# Patient Record
Sex: FEMALE | Race: WHITE | NOT HISPANIC OR LATINO | Employment: OTHER | URBAN - METROPOLITAN AREA
[De-identification: names, ages, dates, MRNs, and addresses within clinical notes are randomized per-mention and may not be internally consistent; named-entity substitution may affect disease eponyms.]

---

## 2017-01-03 RX ORDER — CYCLOBENZAPRINE HCL 5 MG
TABLET ORAL
Qty: 90 TAB | Refills: 1 | Status: SHIPPED | OUTPATIENT
Start: 2017-01-03 | End: 2017-03-11 | Stop reason: SDUPTHER

## 2017-01-03 NOTE — TELEPHONE ENCOUNTER
Was the patient seen in the last year in this department? Yes     Does patient have an active prescription for medications requested? Yes     Received Request Via: Pharmacy

## 2017-01-03 NOTE — TELEPHONE ENCOUNTER
Was the patient seen in the last year in this department? Yes 11/23/16    Does patient have an active prescription for medications requested? No     Received Request Via: Pharmacy

## 2017-03-13 RX ORDER — CYCLOBENZAPRINE HCL 5 MG
TABLET ORAL
Qty: 90 TAB | Refills: 2 | Status: SHIPPED | OUTPATIENT
Start: 2017-03-13 | End: 2018-03-30

## 2017-03-30 ENCOUNTER — HOSPITAL ENCOUNTER (EMERGENCY)
Facility: MEDICAL CENTER | Age: 47
End: 2017-03-30
Attending: EMERGENCY MEDICINE
Payer: MEDICARE

## 2017-03-30 VITALS
SYSTOLIC BLOOD PRESSURE: 125 MMHG | BODY MASS INDEX: 29.75 KG/M2 | RESPIRATION RATE: 16 BRPM | DIASTOLIC BLOOD PRESSURE: 92 MMHG | WEIGHT: 162.7 LBS | TEMPERATURE: 98.3 F | OXYGEN SATURATION: 97 % | HEART RATE: 116 BPM

## 2017-03-30 DIAGNOSIS — H65.91 RIGHT NON-SUPPURATIVE OTITIS MEDIA: ICD-10-CM

## 2017-03-30 DIAGNOSIS — J06.9 UPPER RESPIRATORY TRACT INFECTION, UNSPECIFIED TYPE: ICD-10-CM

## 2017-03-30 DIAGNOSIS — J40 BRONCHITIS: ICD-10-CM

## 2017-03-30 PROCEDURE — 99282 EMERGENCY DEPT VISIT SF MDM: CPT

## 2017-03-30 RX ORDER — ACETIC ACID 20.65 MG/ML
2 SOLUTION AURICULAR (OTIC) 3 TIMES DAILY
Qty: 1 BOTTLE | Refills: 0 | Status: SHIPPED | OUTPATIENT
Start: 2017-03-30 | End: 2018-04-23

## 2017-03-30 RX ORDER — ALBUTEROL SULFATE 90 UG/1
2 AEROSOL, METERED RESPIRATORY (INHALATION) EVERY 4 HOURS PRN
Qty: 1 INHALER | Refills: 1 | OUTPATIENT
Start: 2017-03-30 | End: 2018-04-23

## 2017-03-30 RX ORDER — AZITHROMYCIN 250 MG/1
TABLET, FILM COATED ORAL
Qty: 6 QUANTITY SUFFICIENT | Refills: 0 | OUTPATIENT
Start: 2017-03-30 | End: 2018-03-30

## 2017-03-30 RX ORDER — HYDROCODONE BITARTRATE AND ACETAMINOPHEN 5; 325 MG/1; MG/1
1-2 TABLET ORAL EVERY 6 HOURS PRN
Qty: 10 TAB | Refills: 0 | Status: SHIPPED | OUTPATIENT
Start: 2017-03-30 | End: 2018-03-30

## 2017-03-30 ASSESSMENT — LIFESTYLE VARIABLES: DO YOU DRINK ALCOHOL: NO

## 2017-03-30 NOTE — ED NOTES
"Chief Complaint   Patient presents with   • Ear Drainage     pt reports that her right ear \"ruptured\" while she was sleeping. pt reports heavy discharge from ear.     Pt crying and inconsolable in triage.  Blood pressure 125/92, pulse 116, temperature 36.8 °C (98.3 °F), resp. rate 16, weight 73.8 kg (162 lb 11.2 oz), SpO2 97 %.  Pt informed of wait times. Educated on triage process.  Asked to return to triage RN for any new or worsening of symptoms. Thanked for patience.        "

## 2017-03-30 NOTE — DISCHARGE INSTRUCTIONS
Bronchitis  Bronchitis is the body's way of reacting to injury and/or infection (inflammation) of the bronchi. Bronchi are the air tubes that extend from the windpipe into the lungs. If the inflammation becomes severe, it may cause shortness of breath.  CAUSES   Inflammation may be caused by:  · A virus.  · Germs (bacteria).  · Dust.  · Allergens.  · Pollutants and many other irritants.  The cells lining the bronchial tree are covered with tiny hairs (cilia). These constantly beat upward, away from the lungs, toward the mouth. This keeps the lungs free of pollutants. When these cells become too irritated and are unable to do their job, mucus begins to develop. This causes the characteristic cough of bronchitis. The cough clears the lungs when the cilia are unable to do their job. Without either of these protective mechanisms, the mucus would settle in the lungs. Then you would develop pneumonia.  Smoking is a common cause of bronchitis and can contribute to pneumonia. Stopping this habit is the single most important thing you can do to help yourself.  TREATMENT   · Your caregiver may prescribe an antibiotic if the cough is caused by bacteria. Also, medicines that open up your airways make it easier to breathe. Your caregiver may also recommend or prescribe an expectorant. It will loosen the mucus to be coughed up. Only take over-the-counter or prescription medicines for pain, discomfort, or fever as directed by your caregiver.  · Removing whatever causes the problem (smoking, for example) is critical to preventing the problem from getting worse.  · Cough suppressants may be prescribed for relief of cough symptoms.  · Inhaled medicines may be prescribed to help with symptoms now and to help prevent problems from returning.  · For those with recurrent (chronic) bronchitis, there may be a need for steroid medicines.  SEEK IMMEDIATE MEDICAL CARE IF:   · During treatment, you develop more pus-like mucus (purulent  sputum).  · You have a fever.  · Your baby is older than 3 months with a rectal temperature of 102° F (38.9° C) or higher.  · Your baby is 3 months old or younger with a rectal temperature of 100.4° F (38° C) or higher.  · You become progressively more ill.  · You have increased difficulty breathing, wheezing, or shortness of breath.  It is necessary to seek immediate medical care if you are elderly or sick from any other disease.  MAKE SURE YOU:   · Understand these instructions.  · Will watch your condition.  · Will get help right away if you are not doing well or get worse.  Document Released: 12/18/2006 Document Revised: 03/11/2013 Document Reviewed: 10/27/2009  [employer]® Patient Information ©2014 [employer].    Ear Drops, Adult  You have been diagnosed with a condition requiring you to put drops of medicine into your outer ear.  HOME CARE INSTRUCTIONS   · Put drops in the affected ear as instructed. After putting the drops in, you will need to lie down with the affected ear facing up for ten minutes so the drops will remain in the ear canal and run down and fill the canal. Continue using the ear drops for as long as directed by your health care provider.  · Prior to getting up, put a cotton ball gently in your ear canal. Leave enough of the cotton ball out so it can be easily removed. Do not attempt to push this down into the canal with a cotton-tipped swab or other instrument.  · Do not irrigate or wash out your ears if you have had a perforated eardrum or mastoid surgery, or unless instructed to do so by your health care provider.  · Keep appointments with your health care provider as instructed.  · Finish all medicine, or use for the length of time prescribed by your health care provider. Continue the drops even if your problem seems to be doing well after a couple days, or continue as instructed.  SEEK MEDICAL CARE IF:  · You become worse or develop increasing pain.  · You notice any unusual drainage  from your ear (particularly if the drainage has a bad smell).  · You develop hearing difficulties.  · You experience a serious form of dizziness in which you feel as if the room is spinning, and you feel nauseated (vertigo).  · The outside of your ear becomes red or swollen or both. This may be a sign of an allergic reaction.  MAKE SURE YOU:   · Understand these instructions.  · Will watch your condition.  · Will get help right away if you are not doing well or get worse.     This information is not intended to replace advice given to you by your health care provider. Make sure you discuss any questions you have with your health care provider.     Document Released: 12/12/2002 Document Revised: 01/08/2016 Document Reviewed: 07/15/2014  [employer] Interactive Patient Education ©2016 [employer]    Otitis Media, Adult  Otitis media is redness, soreness, and inflammation of the middle ear. Otitis media may be caused by allergies or, most commonly, by infection. Often it occurs as a complication of the common cold.  SIGNS AND SYMPTOMS  Symptoms of otitis media may include:  · Earache.  · Fever.  · Ringing in your ear.  · Headache.  · Leakage of fluid from the ear.  DIAGNOSIS  To diagnose otitis media, your health care provider will examine your ear with an otoscope. This is an instrument that allows your health care provider to see into your ear in order to examine your eardrum. Your health care provider also will ask you questions about your symptoms.  TREATMENT   Typically, otitis media resolves on its own within 3-5 days. Your health care provider may prescribe medicine to ease your symptoms of pain. If otitis media does not resolve within 5 days or is recurrent, your health care provider may prescribe antibiotic medicines if he or she suspects that a bacterial infection is the cause.  HOME CARE INSTRUCTIONS   · If you were prescribed an antibiotic medicine, finish it all even if you start to feel better.  · Take  "medicines only as directed by your health care provider.  · Keep all follow-up visits as directed by your health care provider.  SEEK MEDICAL CARE IF:  · You have otitis media only in one ear, or bleeding from your nose, or both.  · You notice a lump on your neck.  · You are not getting better in 3-5 days.  · You feel worse instead of better.  SEEK IMMEDIATE MEDICAL CARE IF:   · You have pain that is not controlled with medicine.  · You have swelling, redness, or pain around your ear or stiffness in your neck.  · You notice that part of your face is paralyzed.  · You notice that the bone behind your ear (mastoid) is tender when you touch it.  MAKE SURE YOU:   · Understand these instructions.  · Will watch your condition.  · Will get help right away if you are not doing well or get worse.     This information is not intended to replace advice given to you by your health care provider. Make sure you discuss any questions you have with your health care provider.     Document Released: 09/22/2005 Document Revised: 01/08/2016 Document Reviewed: 07/15/2014  [employer] Interactive Patient Education ©2016 [employer]    Viral Infections  A viral infection can be caused by different types of viruses. Most viral infections are not serious and resolve on their own. However, some infections may cause severe symptoms and may lead to further complications.  SYMPTOMS  Viruses can frequently cause:  · Minor sore throat.  · Aches and pains.  · Headaches.  · Runny nose.  · Different types of rashes.  · Watery eyes.  · Tiredness.  · Cough.  · Loss of appetite.  · Gastrointestinal infections, resulting in nausea, vomiting, and diarrhea.  These symptoms do not respond to antibiotics because the infection is not caused by bacteria. However, you might catch a bacterial infection following the viral infection. This is sometimes called a \"superinfection.\" Symptoms of such a bacterial infection may include:  · Worsening sore throat with pus " and difficulty swallowing.  · Swollen neck glands.  · Chills and a high or persistent fever.  · Severe headache.  · Tenderness over the sinuses.  · Persistent overall ill feeling (malaise), muscle aches, and tiredness (fatigue).  · Persistent cough.  · Yellow, green, or brown mucus production with coughing.  HOME CARE INSTRUCTIONS   · Only take over-the-counter or prescription medicines for pain, discomfort, diarrhea, or fever as directed by your caregiver.  · Drink enough water and fluids to keep your urine clear or pale yellow. Sports drinks can provide valuable electrolytes, sugars, and hydration.  · Get plenty of rest and maintain proper nutrition. Soups and broths with crackers or rice are fine.  SEEK IMMEDIATE MEDICAL CARE IF:   · You have severe headaches, shortness of breath, chest pain, neck pain, or an unusual rash.  · You have uncontrolled vomiting, diarrhea, or you are unable to keep down fluids.  · You or your child has an oral temperature above 102° F (38.9° C), not controlled by medicine.  · Your baby is older than 3 months with a rectal temperature of 102° F (38.9° C) or higher.  · Your baby is 3 months old or younger with a rectal temperature of 100.4° F (38° C) or higher.  MAKE SURE YOU:   · Understand these instructions.  · Will watch your condition.  · Will get help right away if you are not doing well or get worse.     This information is not intended to replace advice given to you by your health care provider. Make sure you discuss any questions you have with your health care provider.     Document Released: 09/27/2006 Document Revised: 03/11/2013 Document Reviewed: 04/23/2012  [email or web address] Interactive Patient Education ©2016 [email or web address]  Return if fever, vomiting or if no better in 12 hours.

## 2017-03-30 NOTE — ED AVS SNAPSHOT
3/30/2017          Taylor Salamanca  [address] [place] [id number]    Dear Taylor:    [facility] wants to ensure your discharge home is safe and you or your loved ones have had all your questions answered regarding your care after you leave the hospital.    You may receive a telephone call within two days of your discharge.  This call is to make certain you understand your discharge instructions as well as ensure we provided you with the best care possible during your stay with us.     The call will only last approximately 3-5 minutes and will be done by a nurse.    Once again, we want to ensure your discharge home is safe and that you have a clear understanding of any next steps in your care.  If you have any questions or concerns, please do not hesitate to contact us, we are here for you.  Thank you for choosing [facility] for your healthcare needs.    Sincerely,    Db Gonzales    [facility]

## 2017-03-30 NOTE — ED PROVIDER NOTES
"ED Provider Note    CHIEF COMPLAINT  Chief Complaint   Patient presents with   • Ear Drainage     pt reports that her right ear \"ruptured\" while she was sleeping. pt reports heavy discharge from ear.       HPI  Taylor Salamanca is a 46 y.o. female who presents with coughing, for the last 4 days, cough is nonproductive. No fever. Has had a sore throat. Last night she noticed drainage from the right ear, slight right ear pain. No vomiting or diarrhea.    REVIEW OF SYSTEMS  See HPI for further details. History of narcolepsy, depression low back pain thyroid cancerDenies other G.I., G.U.. endrocine, cardiovascular, respriatory or neurological problems.     PAST MEDICAL HISTORY  Past Medical History   Diagnosis Date   • Narcolepsy and cataplexy    • Bipolar 2 disorder (CMS-HCC)    • LBP (low back pain)    • Neck pain    • Allergy    • Depression    • Hyperlipidemia    • Arthritis    • Aphthous stomatitis    • MEDICAL HOME    • Thyroid cancer (CMS-[facility]) 1/2012     followed by [facility].  papillary cancer treated with surgery.  will start chemo         SOCIAL HISTORY        CURRENT MEDICATIONS  Home Medications     **Home medications have not yet been reviewed for this encounter**          ALLERGIES  Allergies   Allergen Reactions   • Nkda [No Known Drug Allergy]        PHYSICAL EXAM  VITAL SIGNS: /92 mmHg  Pulse 116  Temp(Src) 36.8 °C (98.3 °F)  Resp 16  Wt 73.8 kg (162 lb 11.2 oz)  SpO2 97%  Constitutional: Well developed, Well nourished, No acute distress, Non-toxic appearance.   HENT: Normocephalic, Atraumatic, F tears normal right ear, there is exudate in the right external auditory canal however the canal is nontender, Oropharynx moist, No oral exudates, Nose normal.   Eyes: PERRLA, EOMI, Conjunctiva normal, No discharge.   Neck: Normal range of motion, No tenderness, Supple, No stridor.   Cardiovascular:  Heart sounds are normal no murmurs or rubs  Thorax & Lungs: Lungs are clear equal breath hands " bilaterally no rhonchi rales or wheezes. Chest wall motion is nonlabored  Abdomen: Bowel sounds normal, Soft, No tenderness, No masses, No pulsatile masses.   Skin: Warm, Dry, No erythema, No rash.   Neurologic: Alert & oriented x 3, Normal motor function, Normal sensory function, No focal deficits noted.         COURSE & MEDICAL DECISION MAKING  Pertinent Labs & Imaging studies reviewed. (See chart for details)    Patient probably has a URI, bronchitis, however probably has otitis media with perforation of the right tympanic membrane. We treated with Cortisporin otic drops, Z-Julius, albuterol Norco. I have checked with PMD  FINAL IMPRESSION  1.  1. Upper respiratory tract infection, unspecified type    2. Bronchitis    3. Right non-suppurative otitis media        2.   3.    Disposition:  Discharge instructions are understood. This patient is to return if fever vomiting or no better in 12 hours. Follow up with the [facility] clinic or private physician. Information sheets on URI bronchitis otitis media    Electronically signed by: Binh Gunn, 3/30/2017 10:49 AM

## 2017-03-30 NOTE — ED AVS SNAPSHOT
Home Care Instructions                                                                                                                Taylor Salamanca   MRN: [id number]    Department:  [facility], Emergency Dept   Date of Visit:  3/30/2017            [facility], Emergency Dept    [address]    [place] [address]    Phone:  [phone]      You were seen by     Binh Gunn M.D.      Your Diagnosis Was     Upper respiratory tract infection, unspecified type     J06.9       Follow-up Information     1. Follow up with LINDA Aguirre. Schedule an appointment as soon as possible for a visit today.    Specialty:  Family Medicine    Contact information    [address] [place] [address] [phone]        Medication Information     Review all of your home medications and newly ordered medications with your primary doctor and/or pharmacist as soon as possible. Follow medication instructions as directed by your doctor and/or pharmacist.     Please keep your complete medication list with you and share with your physician. Update the information when medications are discontinued, doses are changed, or new medications (including over-the-counter products) are added; and carry medication information at all times in the event of emergency situations.               Medication List      START taking these medications        Instructions    Morning Afternoon Evening Bedtime    acetic acid 2 % otic solution   Commonly known as:  VOSOL        Place 2 Drops in right ear 3 times a day.   Dose:  2 Drop                        albuterol 108 (90 BASE) MCG/ACT Aers inhalation aerosol        Inhale 2 Puffs by mouth every four hours as needed for Shortness of Breath.   Dose:  2 Puff                        azithromycin 250 MG Tabs   Commonly known as:  ZITHROMAX Z-MIKE        Doctor's comments:  2 tablets on day 1, then 1 tablet by mouth daily for the next 4 days   2 tablets on day  1, then 1 tablet by mouth daily for the next 4 days                          ASK your doctor about these medications        Instructions    Morning Afternoon Evening Bedtime    acetaminophen-codeine #3 300-30 MG Tabs   Commonly known as:  TYLENOL #3        Take 1 Tab by mouth every 6 hours as needed for Mild Pain.   Dose:  1 Tab                        Armodafinil 150 MG Tabs        Take 150 mg by mouth every day.   Dose:  150 mg                        aspirin 325 MG Tabs   Commonly known as:  ASA        Take 1,300-1,625 mg by mouth 4 times a day. 4-5 times daily   Dose:  3797-7823 mg                        celecoxib 200 MG Caps   Commonly known as:  CELEBREX        Doctor's comments:  Discontinue mobic   Take 1 Cap by mouth every day.   Dose:  200 mg                        cyclobenzaprine 5 MG tablet   Commonly known as:  FLEXERIL        TAKE 1 TABLET BY MOUTH 3 TIMES A DAY AS NEEDED FOR MUSCLE SPASMS.                        fluoxetine 40 MG capsule   Commonly known as:  PROZAC        TAKE ONE CAPSULE BY MOUTH ONCE DAILY                        fluticasone 50 MCG/ACT nasal spray   Commonly known as:  FLONASE        Spray 2 Sprays in nose every day.   Dose:  2 Spray                        gabapentin 100 MG Caps   Commonly known as:  NEURONTIN        Take 100-200 mg by mouth every day. 100 mg every morning 200 mg at bedtime   Dose:  100-200 mg                        * hydrocodone-acetaminophen 7.5-325 MG per tablet   What changed:  Another medication with the same name was added. Make sure you understand how and when to take each.   Commonly known as:  NORCO   Ask about: Which instructions should I use?        Take 1 Tab by mouth every 12 hours as needed for Severe Pain.   Dose:  1 Tab                        * hydrocodone-acetaminophen 5-325 MG Tabs per tablet   What changed:  You were already taking a medication with the same name, and this prescription was added. Make sure you understand how and when to take each.      Commonly known as:  NORCO   Ask about: Which instructions should I use?        Take 1-2 Tabs by mouth every 6 hours as needed.   Dose:  1-2 Tab                        ibuprofen 600 MG Tabs   Commonly known as:  MOTRIN        Take 1 Tab by mouth every 6 hours as needed.   Dose:  600 mg                        levothyroxine 150 MCG Tabs   Commonly known as:  SYNTHROID        Take 1 Tab by mouth every morning before breakfast. ON EMPTY STOMACH   Dose:  150 mcg                        tramadol 50 MG Tabs   Commonly known as:  ULTRAM        Doctor's comments:  Not to exceed 5 additional fills before 03/21/2017.   Take 1 Tab by mouth every 6 hours as needed.   Dose:  50 mg                        zolpidem 5 MG Tabs   Commonly known as:  AMBIEN        Take 1-2 tablets by mouth every evening as needed for insomnia. Bring to sleep study.                        * Notice:  This list has 2 medication(s) that are the same as other medications prescribed for you. Read the directions carefully, and ask your doctor or other care provider to review them with you.         Where to Get Your Medications      These medications were sent to [id number] - [place] - [address] [place] [address] AT [address] [address]     Phone:  [phone]    - albuterol 108 (90 BASE) MCG/ACT Aers inhalation aerosol  - azithromycin 250 MG Tabs      You can get these medications from any pharmacy     Bring a paper prescription for each of these medications    - acetic acid 2 % otic solution  - hydrocodone-acetaminophen 5-325 MG Tabs per tablet              Discharge Instructions       Bronchitis  Bronchitis is the body's way of reacting to injury and/or infection (inflammation) of the bronchi. Bronchi are the air tubes that extend from the windpipe into the lungs. If the inflammation becomes severe, it may cause shortness of breath.  CAUSES   Inflammation may be caused by:  · A virus.  · Germs  (bacteria).  · Dust.  · Allergens.  · Pollutants and many other irritants.  The cells lining the bronchial tree are covered with tiny hairs (cilia). These constantly beat upward, away from the lungs, toward the mouth. This keeps the lungs free of pollutants. When these cells become too irritated and are unable to do their job, mucus begins to develop. This causes the characteristic cough of bronchitis. The cough clears the lungs when the cilia are unable to do their job. Without either of these protective mechanisms, the mucus would settle in the lungs. Then you would develop pneumonia.  Smoking is a common cause of bronchitis and can contribute to pneumonia. Stopping this habit is the single most important thing you can do to help yourself.  TREATMENT   · Your caregiver may prescribe an antibiotic if the cough is caused by bacteria. Also, medicines that open up your airways make it easier to breathe. Your caregiver may also recommend or prescribe an expectorant. It will loosen the mucus to be coughed up. Only take over-the-counter or prescription medicines for pain, discomfort, or fever as directed by your caregiver.  · Removing whatever causes the problem (smoking, for example) is critical to preventing the problem from getting worse.  · Cough suppressants may be prescribed for relief of cough symptoms.  · Inhaled medicines may be prescribed to help with symptoms now and to help prevent problems from returning.  · For those with recurrent (chronic) bronchitis, there may be a need for steroid medicines.  SEEK IMMEDIATE MEDICAL CARE IF:   · During treatment, you develop more pus-like mucus (purulent sputum).  · You have a fever.  · Your baby is older than 3 months with a rectal temperature of 102° F (38.9° C) or higher.  · Your baby is 3 months old or younger with a rectal temperature of 100.4° F (38° C) or higher.  · You become progressively more ill.  · You have increased difficulty breathing, wheezing, or  shortness of breath.  It is necessary to seek immediate medical care if you are elderly or sick from any other disease.  MAKE SURE YOU:   · Understand these instructions.  · Will watch your condition.  · Will get help right away if you are not doing well or get worse.  Document Released: 12/18/2006 Document Revised: 03/11/2013 Document Reviewed: 10/27/2009  Exit[employer]® Patient Information ©2014 [employer].    Ear Drops, Adult  You have been diagnosed with a condition requiring you to put drops of medicine into your outer ear.  HOME CARE INSTRUCTIONS   · Put drops in the affected ear as instructed. After putting the drops in, you will need to lie down with the affected ear facing up for ten minutes so the drops will remain in the ear canal and run down and fill the canal. Continue using the ear drops for as long as directed by your health care provider.  · Prior to getting up, put a cotton ball gently in your ear canal. Leave enough of the cotton ball out so it can be easily removed. Do not attempt to push this down into the canal with a cotton-tipped swab or other instrument.  · Do not irrigate or wash out your ears if you have had a perforated eardrum or mastoid surgery, or unless instructed to do so by your health care provider.  · Keep appointments with your health care provider as instructed.  · Finish all medicine, or use for the length of time prescribed by your health care provider. Continue the drops even if your problem seems to be doing well after a couple days, or continue as instructed.  SEEK MEDICAL CARE IF:  · You become worse or develop increasing pain.  · You notice any unusual drainage from your ear (particularly if the drainage has a bad smell).  · You develop hearing difficulties.  · You experience a serious form of dizziness in which you feel as if the room is spinning, and you feel nauseated (vertigo).  · The outside of your ear becomes red or swollen or both. This may be a sign of an allergic  reaction.  MAKE SURE YOU:   · Understand these instructions.  · Will watch your condition.  · Will get help right away if you are not doing well or get worse.     This information is not intended to replace advice given to you by your health care provider. Make sure you discuss any questions you have with your health care provider.     Document Released: 12/12/2002 Document Revised: 01/08/2016 Document Reviewed: 07/15/2014  [employer] Interactive Patient Education ©2016 [employer]    Otitis Media, Adult  Otitis media is redness, soreness, and inflammation of the middle ear. Otitis media may be caused by allergies or, most commonly, by infection. Often it occurs as a complication of the common cold.  SIGNS AND SYMPTOMS  Symptoms of otitis media may include:  · Earache.  · Fever.  · Ringing in your ear.  · Headache.  · Leakage of fluid from the ear.  DIAGNOSIS  To diagnose otitis media, your health care provider will examine your ear with an otoscope. This is an instrument that allows your health care provider to see into your ear in order to examine your eardrum. Your health care provider also will ask you questions about your symptoms.  TREATMENT   Typically, otitis media resolves on its own within 3-5 days. Your health care provider may prescribe medicine to ease your symptoms of pain. If otitis media does not resolve within 5 days or is recurrent, your health care provider may prescribe antibiotic medicines if he or she suspects that a bacterial infection is the cause.  HOME CARE INSTRUCTIONS   · If you were prescribed an antibiotic medicine, finish it all even if you start to feel better.  · Take medicines only as directed by your health care provider.  · Keep all follow-up visits as directed by your health care provider.  SEEK MEDICAL CARE IF:  · You have otitis media only in one ear, or bleeding from your nose, or both.  · You notice a lump on your neck.  · You are not getting better in 3-5 days.  · You feel  "worse instead of better.  SEEK IMMEDIATE MEDICAL CARE IF:   · You have pain that is not controlled with medicine.  · You have swelling, redness, or pain around your ear or stiffness in your neck.  · You notice that part of your face is paralyzed.  · You notice that the bone behind your ear (mastoid) is tender when you touch it.  MAKE SURE YOU:   · Understand these instructions.  · Will watch your condition.  · Will get help right away if you are not doing well or get worse.     This information is not intended to replace advice given to you by your health care provider. Make sure you discuss any questions you have with your health care provider.     Document Released: 09/22/2005 Document Revised: 01/08/2016 Document Reviewed: 07/15/2014  [employer] Interactive Patient Education ©2016 [employer]    Viral Infections  A viral infection can be caused by different types of viruses. Most viral infections are not serious and resolve on their own. However, some infections may cause severe symptoms and may lead to further complications.  SYMPTOMS  Viruses can frequently cause:  · Minor sore throat.  · Aches and pains.  · Headaches.  · Runny nose.  · Different types of rashes.  · Watery eyes.  · Tiredness.  · Cough.  · Loss of appetite.  · Gastrointestinal infections, resulting in nausea, vomiting, and diarrhea.  These symptoms do not respond to antibiotics because the infection is not caused by bacteria. However, you might catch a bacterial infection following the viral infection. This is sometimes called a \"superinfection.\" Symptoms of such a bacterial infection may include:  · Worsening sore throat with pus and difficulty swallowing.  · Swollen neck glands.  · Chills and a high or persistent fever.  · Severe headache.  · Tenderness over the sinuses.  · Persistent overall ill feeling (malaise), muscle aches, and tiredness (fatigue).  · Persistent cough.  · Yellow, green, or brown mucus production with coughing.  HOME CARE " INSTRUCTIONS   · Only take over-the-counter or prescription medicines for pain, discomfort, diarrhea, or fever as directed by your caregiver.  · Drink enough water and fluids to keep your urine clear or pale yellow. Sports drinks can provide valuable electrolytes, sugars, and hydration.  · Get plenty of rest and maintain proper nutrition. Soups and broths with crackers or rice are fine.  SEEK IMMEDIATE MEDICAL CARE IF:   · You have severe headaches, shortness of breath, chest pain, neck pain, or an unusual rash.  · You have uncontrolled vomiting, diarrhea, or you are unable to keep down fluids.  · You or your child has an oral temperature above 102° F (38.9° C), not controlled by medicine.  · Your baby is older than 3 months with a rectal temperature of 102° F (38.9° C) or higher.  · Your baby is 3 months old or younger with a rectal temperature of 100.4° F (38° C) or higher.  MAKE SURE YOU:   · Understand these instructions.  · Will watch your condition.  · Will get help right away if you are not doing well or get worse.     This information is not intended to replace advice given to you by your health care provider. Make sure you discuss any questions you have with your health care provider.     Document Released: 09/27/2006 Document Revised: 03/11/2013 Document Reviewed: 04/23/2012  [employer] Interactive Patient Education ©2016 [employer]  Return if fever, vomiting or if no better in 12 hours.          Patient Information     Patient Information    Following emergency treatment: all patient requiring follow-up care must return either to a private physician or a clinic if your condition worsens before you are able to obtain further medical attention, please return to the emergency room.     Billing Information    At [facility], we work to make the billing process streamlined for our patients.  Our Representatives are here to answer any questions you may have regarding your hospital bill.  If you have  insurance coverage and have supplied your insurance information to us, we will submit a claim to your insurer on your behalf.  Should you have any questions regarding your bill, we can be reached online or by phone as follows:  Online: You are able pay your bills online or live chat with our representatives about any billing questions you may have. We are here to help Monday - Friday from 8:00am to 7:30pm and 9:00am - 12:00pm on Saturdays.  Please visit [email or web address]  for more information.   Phone:  [phone] or [phone]    Please note that your emergency physician, surgeon, pathologist, radiologist, anesthesiologist, and other specialists are not employed by [facility] and will therefore bill separately for their services.  Please contact them directly for any questions concerning their bills at the numbers below:     Emergency Physician Services:  [phone]  [facility]:  [phone]  [facility]:  [phone]  [facility]:  [phone]    1. Your final bill may vary from the amount quoted upon discharge if all procedures are not complete at that time, or if your doctor has additional procedures of which we are not aware. You will receive an additional bill if you return to the Emergency Department at [facility] for suture removal regardless of the facility of which the sutures were placed.     2. Please arrange for settlement of this account at the emergency registration.    3. All self-pay accounts are due in full at the time of treatment.  If you are unable to meet this obligation then payment is expected within 4-5 days.     4. If you have had radiology studies (CT, X-ray, Ultrasound, MRI), you have received a preliminary result during your emergency department visit. Please contact the radiology department [phone] to receive a copy of your final result. Please discuss the Final result with your  primary physician or with the follow up physician provided.     Crisis Hotline:  [place] Crisis Hotline:  8-563-KJHFMAJ or 1-367.993.7110  [place] Crisis Hotline:    1-791.999.2976 or 787-016-5051         ED Discharge Follow Up Questions    1. In order to provide you with very good care, we would like to follow up with a phone call in the next few days.  May we have your permission to contact you?     YES /  NO    2. What is the best phone number to call you? (       )_____-__________    3. What is the best time to call you?      Morning  /  Afternoon  /  Evening                   Patient Signature:  ____________________________________________________________    Date:  ____________________________________________________________

## 2017-03-31 ENCOUNTER — HOSPITAL ENCOUNTER (EMERGENCY)
Facility: MEDICAL CENTER | Age: 47
End: 2017-03-31
Attending: EMERGENCY MEDICINE
Payer: MEDICARE

## 2017-03-31 ENCOUNTER — PATIENT OUTREACH (OUTPATIENT)
Dept: HEALTH INFORMATION MANAGEMENT | Facility: OTHER | Age: 47
End: 2017-03-31

## 2017-03-31 VITALS
WEIGHT: 158.95 LBS | HEIGHT: 62 IN | HEART RATE: 67 BPM | RESPIRATION RATE: 20 BRPM | SYSTOLIC BLOOD PRESSURE: 103 MMHG | BODY MASS INDEX: 29.25 KG/M2 | TEMPERATURE: 98.8 F | OXYGEN SATURATION: 95 % | DIASTOLIC BLOOD PRESSURE: 69 MMHG

## 2017-03-31 DIAGNOSIS — H72.91 RUPTURED TYMPANIC MEMBRANE, RIGHT: ICD-10-CM

## 2017-03-31 DIAGNOSIS — Z76.0 MEDICATION REFILL: ICD-10-CM

## 2017-03-31 PROCEDURE — 700101 HCHG RX REV CODE 250: Performed by: EMERGENCY MEDICINE

## 2017-03-31 PROCEDURE — 700111 HCHG RX REV CODE 636 W/ 250 OVERRIDE (IP): Performed by: EMERGENCY MEDICINE

## 2017-03-31 PROCEDURE — 96372 THER/PROPH/DIAG INJ SC/IM: CPT

## 2017-03-31 PROCEDURE — 99284 EMERGENCY DEPT VISIT MOD MDM: CPT

## 2017-03-31 RX ORDER — ALBUTEROL SULFATE 90 UG/1
2 AEROSOL, METERED RESPIRATORY (INHALATION) EVERY 4 HOURS PRN
Qty: 1 INHALER | Refills: 1 | Status: SHIPPED | OUTPATIENT
Start: 2017-03-31 | End: 2018-04-25 | Stop reason: SDUPTHER

## 2017-03-31 RX ORDER — AMOXICILLIN 500 MG/1
500 CAPSULE ORAL 3 TIMES DAILY
Qty: 21 CAP | Refills: 0 | Status: SHIPPED | OUTPATIENT
Start: 2017-03-31 | End: 2017-04-07

## 2017-03-31 RX ORDER — CEFTRIAXONE 1 G/1
1000 INJECTION, POWDER, FOR SOLUTION INTRAMUSCULAR; INTRAVENOUS ONCE
Status: COMPLETED | OUTPATIENT
Start: 2017-03-31 | End: 2017-03-31

## 2017-03-31 RX ORDER — LIDOCAINE HYDROCHLORIDE 10 MG/ML
2.1 INJECTION, SOLUTION INFILTRATION; PERINEURAL ONCE
Status: COMPLETED | OUTPATIENT
Start: 2017-03-31 | End: 2017-03-31

## 2017-03-31 RX ADMIN — LIDOCAINE HYDROCHLORIDE 2.1 ML: 10 INJECTION, SOLUTION INFILTRATION; PERINEURAL at 20:39

## 2017-03-31 RX ADMIN — CEFTRIAXONE 1000 MG: 1 INJECTION, POWDER, FOR SOLUTION INTRAMUSCULAR; INTRAVENOUS at 20:39

## 2017-03-31 ASSESSMENT — LIFESTYLE VARIABLES: DO YOU DRINK ALCOHOL: NO

## 2017-03-31 NOTE — ED AVS SNAPSHOT
Home Care Instructions                                                                                                                Taylor Salamanca   MRN: [id number]    Department:  [facility], Emergency Dept   Date of Visit:  3/31/2017            [facility], Emergency Dept    [address] [place] [id number]    Phone:  [phone]      You were seen by     Sean Romo M.D.      Your Diagnosis Was     Medication refill     Z76.0       These are the medications you received during your hospitalization from 03/31/2017 1936 to 03/31/2017 2047     Date/Time Order Dose Route Action    03/31/2017 2039 cefTRIAXone (ROCEPHIN) injection 1,000 mg 1,000 mg Intramuscular Given    03/31/2017 2039 lidocaine (XYLOCAINE) 1 % injection 2.1 mL Other Given      Follow-up Information     1. Follow up with [facility], Emergency Dept.    Specialty:  Emergency Medicine    Why:  If symptoms worsen    Contact information    [address] [place] [id number] [phone]      Medication Information     Review all of your home medications and newly ordered medications with your primary doctor and/or pharmacist as soon as possible. Follow medication instructions as directed by your doctor and/or pharmacist.     Please keep your complete medication list with you and share with your physician. Update the information when medications are discontinued, doses are changed, or new medications (including over-the-counter products) are added; and carry medication information at all times in the event of emergency situations.               Medication List      START taking these medications        Instructions    Morning Afternoon Evening Bedtime    amoxicillin 500 MG Caps   Commonly known as:  AMOXIL        Take 1 Cap by mouth 3 times a day for 7 days.   Dose:  500 mg                          ASK your doctor about these medications        Instructions    Morning  Afternoon Evening Bedtime    acetaminophen-codeine #3 300-30 MG Tabs   Commonly known as:  TYLENOL #3        Take 1 Tab by mouth every 6 hours as needed for Mild Pain.   Dose:  1 Tab                        acetic acid 2 % otic solution   Commonly known as:  VOSOL        Place 2 Drops in right ear 3 times a day.   Dose:  2 Drop                        * albuterol 108 (90 BASE) MCG/ACT Aers inhalation aerosol   What changed:  Another medication with the same name was added. Make sure you understand how and when to take each.   Ask about: Which instructions should I use?        Inhale 2 Puffs by mouth every four hours as needed for Shortness of Breath.   Dose:  2 Puff                        * albuterol 108 (90 BASE) MCG/ACT Aers inhalation aerosol   What changed:  You were already taking a medication with the same name, and this prescription was added. Make sure you understand how and when to take each.   Ask about: Which instructions should I use?        Inhale 2 Puffs by mouth every four hours as needed for Shortness of Breath.   Dose:  2 Puff                        Armodafinil 150 MG Tabs        Take 150 mg by mouth every day.   Dose:  150 mg                        aspirin 325 MG Tabs   Commonly known as:  ASA        Take 1,300-1,625 mg by mouth 4 times a day. 4-5 times daily   Dose:  2705-9292 mg                        azithromycin 250 MG Tabs   Commonly known as:  ZITHROMAX Z-MIKE        Doctor's comments:  2 tablets on day 1, then 1 tablet by mouth daily for the next 4 days   2 tablets on day 1, then 1 tablet by mouth daily for the next 4 days                        celecoxib 200 MG Caps   Commonly known as:  CELEBREX        Doctor's comments:  Discontinue mobic   Take 1 Cap by mouth every day.   Dose:  200 mg                        cyclobenzaprine 5 MG tablet   Commonly known as:  FLEXERIL        TAKE 1 TABLET BY MOUTH 3 TIMES A DAY AS NEEDED FOR MUSCLE SPASMS.                        fluoxetine 40 MG capsule      Commonly known as:  PROZAC        TAKE ONE CAPSULE BY MOUTH ONCE DAILY                        fluticasone 50 MCG/ACT nasal spray   Commonly known as:  FLONASE        Spray 2 Sprays in nose every day.   Dose:  2 Spray                        gabapentin 100 MG Caps   Commonly known as:  NEURONTIN        Take 100-200 mg by mouth every day. 100 mg every morning 200 mg at bedtime   Dose:  100-200 mg                        * hydrocodone-acetaminophen 7.5-325 MG per tablet   Commonly known as:  NORCO        Take 1 Tab by mouth every 12 hours as needed for Severe Pain.   Dose:  1 Tab                        * hydrocodone-acetaminophen 5-325 MG Tabs per tablet   Commonly known as:  NORCO        Take 1-2 Tabs by mouth every 6 hours as needed.   Dose:  1-2 Tab                        ibuprofen 600 MG Tabs   Commonly known as:  MOTRIN        Take 1 Tab by mouth every 6 hours as needed.   Dose:  600 mg                        levothyroxine 150 MCG Tabs   Commonly known as:  SYNTHROID        Take 1 Tab by mouth every morning before breakfast. ON EMPTY STOMACH   Dose:  150 mcg                        tramadol 50 MG Tabs   Commonly known as:  ULTRAM        Doctor's comments:  Not to exceed 5 additional fills before 03/21/2017.   Take 1 Tab by mouth every 6 hours as needed.   Dose:  50 mg                        zolpidem 5 MG Tabs   Commonly known as:  AMBIEN        Take 1-2 tablets by mouth every evening as needed for insomnia. Bring to sleep study.                        * Notice:  This list has 4 medication(s) that are the same as other medications prescribed for you. Read the directions carefully, and ask your doctor or other care provider to review them with you.         Where to Get Your Medications      These medications were sent to [id number] - [place] - [address] [place] [address] AT [address] [place] [address]     Phone:  [phone]    - albuterol 108 (90 BASE) MCG/ACT Aers inhalation  aerosol      You can get these medications from any pharmacy     Bring a paper prescription for each of these medications    - amoxicillin 500 MG Caps              Discharge Instructions       Eardrum Perforation  An eardrum perforation is a puncture or tear in the eardrum. This is also called a ruptured eardrum. The eardrum is a thin, round tissue inside of your ear that separates your ear canal from your middle ear. This is the tissue that detects sound and enables you to hear. An eardrum perforation can cause discomfort and hearing loss. In most cases, the eardrum will heal without treatment and with little or no permanent hearing loss.  CAUSES  An eardrum perforation can result from different causes, including:  · Sudden pressure changes that happen in situations such as scuba diving or flying in an airplane.  · Foreign objects in the ear.  · Inserting a cotton-tipped swab or any blunt object into the ear.  · Loud noise.  · Trauma to the ear.  · Attempting to remove an object from the ear.  SIGNS AND SYMPTOMS  · Hearing loss.  · Ear pain.  · Ringing in the ear.  · Discharge or bleeding from the ear.  · Dizziness.  · Vomiting.  · Facial paralysis.  DIAGNOSIS   Your health care provider will examine your ear using a tool called an otoscope. This tool allows the health care provider to see into your ear to examine your eardrum. Various types of hearing tests may also be done.  TREATMENT   Typically, the eardrum will heal on its own within a few weeks. If your eardrum does not heal, your health care provider may recommend one of the following treatments:  · A procedure to place a patch over your eardrum.  · Surgery to repair your eardrum.  HOME CARE INSTRUCTIONS   · Keep your ear dry. This will improve healing. Do not submerge your head under water until healing is complete. Do not swim or dive until your health care provider approves. While bathing or showering, protect your ear using one of these methods:  ¨ Using  a waterproof earplug.  ¨ Covering a piece of cotton with petroleum jelly and placing it in your outer ear canal.  · Take medicines only as directed by your health care provider.  · Avoid blowing your nose if possible. If you blow your nose, do it gently. Forceful blowing increases the pressure in your middle ear. This may cause further injury or may delay your healing.  · Resume your normal activities after the perforation has healed. Your health care provider can tell you when this has occurred.  · Talk to your health care provider before you fly on an airplane. Air travel is generally allowed with a perforated eardrum.  · Keep all follow-up visits as directed by your health care provider. This is important.  SEEK MEDICAL CARE IF:  · You have a fever.  SEEK IMMEDIATE MEDICAL CARE IF:  · You have blood or pus coming from your ear.  · You have dizziness or problems with balance.  · You have nausea or vomiting.  · You have increased pain.     This information is not intended to replace advice given to you by your health care provider. Make sure you discuss any questions you have with your health care provider.     Document Released: 12/15/2001 Document Revised: 01/08/2016 Document Reviewed: 07/27/2015  [employer] Patient Education ©2016 [employer]            Patient Information     Patient Information    Following emergency treatment: all patient requiring follow-up care must return either to a private physician or a clinic if your condition worsens before you are able to obtain further medical attention, please return to the emergency room.     Billing Information    At [facility], we work to make the billing process streamlined for our patients.  Our Representatives are here to answer any questions you may have regarding your hospital bill.  If you have insurance coverage and have supplied your insurance information to us, we will submit a claim to your insurer on your behalf.  Should you have any  questions regarding your bill, we can be reached online or by phone as follows:  Online: You are able pay your bills online or live chat with our representatives about any billing questions you may have. We are here to help Monday - Friday from 8:00am to 7:30pm and 9:00am - 12:00pm on Saturdays.  Please visit [email or web address]  for more information.   Phone:  [phone] or [phone]    Please note that your emergency physician, surgeon, pathologist, radiologist, anesthesiologist, and other specialists are not employed by [facility] and will therefore bill separately for their services.  Please contact them directly for any questions concerning their bills at the numbers below:     Emergency Physician Services:  [phone]  [facility]:  [phone]  [facility]:  [phone]  [facility]:  [phone]    1. Your final bill may vary from the amount quoted upon discharge if all procedures are not complete at that time, or if your doctor has additional procedures of which we are not aware. You will receive an additional bill if you return to the Emergency Department at [facility] for suture removal regardless of the facility of which the sutures were placed.     2. Please arrange for settlement of this account at the emergency registration.    3. All self-pay accounts are due in full at the time of treatment.  If you are unable to meet this obligation then payment is expected within 4-5 days.     4. If you have had radiology studies (CT, X-ray, Ultrasound, MRI), you have received a preliminary result during your emergency department visit. Please contact the radiology department [phone] to receive a copy of your final result. Please discuss the Final result with your primary physician or with the follow up physician provided.     Crisis Hotline:  National Crisis Hotline:  6-147-ZGXHXEM or 1-850.768.6245  [facility]  Hotline:    1-871.496.2252 or 454-519-7041         ED Discharge Follow Up Questions    1. In order to provide you with very good care, we would like to follow up with a phone call in the next few days.  May we have your permission to contact you?     YES /  NO    2. What is the best phone number to call you? (       )_____-__________    3. What is the best time to call you?      Morning  /  Afternoon  /  Evening                   Patient Signature:  ____________________________________________________________    Date:  ____________________________________________________________

## 2017-03-31 NOTE — DISCHARGE PLANNING
Pt reports she has forgotten her prescriptions after her ER visit yesterday.  Rx's  called in to [facility] in [place].  See AVS for prescriptions.  No other needs verbalized.

## 2017-03-31 NOTE — PROGRESS NOTES
"03/31/2017 1550 - Discharge Outreach attempt - \"Not a working number\". No other number listed.   "

## 2017-03-31 NOTE — ED AVS SNAPSHOT
Cape Wind Access Code: Activation code not generated  Current Cape Wind Status: Active    [employer]  A secure, online tool to manage your health information     [employer]’s Cape Wind® is a secure, online tool that connects you to your personalized health information from the privacy of your home -- day or night - making it very easy for you to manage your healthcare. Once the activation process is completed, you can even access your medical information using the Cape Wind ryan, which is available for free in the Apple Ryan store or Google Play store.     Cape Wind provides the following levels of access (as shown below):   My Chart Features   [facility] Primary Care Doctor [facility]  Specialists [facility]  Urgent  Care Non-[facility]  Primary Care  Doctor   Email your healthcare team securely and privately 24/7 X X X X   Manage appointments: schedule your next appointment; view details of past/upcoming appointments X      Request prescription refills. X      View recent personal medical records, including lab and immunizations X X X X   View health record, including health history, allergies, medications X X X X   Read reports about your outpatient visits, procedures, consult and ER notes X X X X   See your discharge summary, which is a recap of your hospital and/or ER visit that includes your diagnosis, lab results, and care plan. X X       How to register for Cape Wind:  1. Go to  [email or web address].  2. Click on the Sign Up Now box, which takes you to the New Member Sign Up page. You will need to provide the following information:  a. Enter your Cape Wind Access Code exactly as it appears at the top of this page. (You will not need to use this code after you’ve completed the sign-up process. If you do not sign up before the expiration date, you must request a new code.)   b. Enter your date of birth.   c. Enter your home email address.   d. Click Submit, and follow the next screen’s instructions.  3. Create a Cape Wind ID. This will  be your XYZE login ID and cannot be changed, so think of one that is secure and easy to remember.  4. Create a XYZE password. You can change your password at any time.  5. Enter your Password Reset Question and Answer. This can be used at a later time if you forget your password.   6. Enter your e-mail address. This allows you to receive e-mail notifications when new information is available in XYZE.  7. Click Sign Up. You can now view your health information.    For assistance activating your XYZE account, call [phone]

## 2017-03-31 NOTE — ED AVS SNAPSHOT
3/31/2017          Taylor Salamanca  [address] [place] [id number]    Dear Taylor:    [facility] wants to ensure your discharge home is safe and you or your loved ones have had all your questions answered regarding your care after you leave the hospital.    You may receive a telephone call within two days of your discharge.  This call is to make certain you understand your discharge instructions as well as ensure we provided you with the best care possible during your stay with us.     The call will only last approximately 3-5 minutes and will be done by a nurse.    Once again, we want to ensure your discharge home is safe and that you have a clear understanding of any next steps in your care.  If you have any questions or concerns, please do not hesitate to contact us, we are here for you.  Thank you for choosing [facility] for your healthcare needs.    Sincerely,    Db Gonzales    [facility]

## 2017-04-01 NOTE — ED NOTES
Agree with triage note, pt to room in wheelchair, pt states lost  Rx for inhaler and antibiotics.

## 2017-04-01 NOTE — ED PROVIDER NOTES
ER Provider Note     Scribed for Sean Romo, * by Howie Carolina. 3/31/2017, 8:16 PM.    Primary Care Provider: LINDA Aguirre  Means of Arrival: Walk in   History obtained from: Patient  History limited by: None     CHIEF COMPLAINT   Chief Complaint   Patient presents with   • Ear Pain     seen yesterday states has ruptured ear drum.   • Medication Refill     lost prescriptions she receved yesterday at this facility for same complaint     HPI   Taylor Salamanca is a 46 y.o. who presents to the emergency department for severe and persistent right ear pain, onset two days. The patient reports that she was seen in the ED yesterday for these symptoms and was given prescriptions. Per patient she was unable to fill her antibiotic and Albuterol since she lost the prescriptions. She has been using the pain medications and ear drops as prescribed. Associated symptoms include sore throat.      REVIEW OF SYSTEMS     General: No fever or chills.  Eyes: No eye discharge. No eye pain. Positive vision changes.    Ear nose throat: Positive congestion, runny nose, sore throat, ear pain, and drainage.   Pulmonary: No shortness of breath. Positive cough.   Cardiovascular: Positive chest pain.   GI: No abdominal pain nausea or vomiting.  : No dysuria or hematuria  Dermatologic: No rashes. No abrasions.  Neurologic: No weakness or numbness.  Psychiatric: No anxiety or stress.  E.     PAST MEDICAL HISTORY  Past Medical History   Diagnosis Date   • Narcolepsy and cataplexy    • Bipolar 2 disorder (CMS-[facility])    • LBP (low back pain)    • Neck pain    • Allergy    • Depression    • Hyperlipidemia    • Arthritis    • Aphthous stomatitis    • MEDICAL HOME    • Thyroid cancer (CMS-[facility]) 1/2012     followed by [facility].  papillary cancer treated with surgery.  will start chemo     SURGICAL HISTORY  Past Surgical History   Procedure Laterality Date   • Gyn surgery       lumpectomy   • Tubal ligation     • Breast  biopsy       with benign breast mass removal   • Thyroidectomy total  3/14/2012     Performed by [facility] at SURGERY SAME DAY [facility] ORS   • Node dissection  3/14/2012     Performed by [facility] MAX at SURGERY SAME DAY [facility] ORS   • Submandible abscess incision and drainage Right 8/11/2016     Procedure: SUBMANDIBLE ABSCESS INCISION AND DRAINAGE;  Surgeon: Chun Newman D.D.S.;  Location: SURGERY [facility];  Service:    • Dental extraction(s) Right 8/11/2016     Procedure: DENTAL EXTRACTION(S);  Surgeon: Chun Newman D.D.S.;  Location: SURGERY [facility];  Service:      SOCIAL HISTORY  Social History   Substance Use Topics   • Smoking status: Current Every Day Smoker -- 0.25 packs/day for 3 years     Types: Cigarettes   • Smokeless tobacco: Never Used      Comment: started 7/09   • Alcohol Use: No     CURRENT MEDICATIONS  Previous Medications    ACETAMINOPHEN-CODEINE #3 (TYLENOL #3) 300-30 MG TAB    Take 1 Tab by mouth every 6 hours as needed for Mild Pain.    ACETIC ACID (VOSOL) 2 % OTIC SOLUTION    Place 2 Drops in right ear 3 times a day.    ALBUTEROL 108 (90 BASE) MCG/ACT AERO SOLN INHALATION AEROSOL    Inhale 2 Puffs by mouth every four hours as needed for Shortness of Breath.    ARMODAFINIL 150 MG TAB    Take 150 mg by mouth every day.    ASPIRIN (ASA) 325 MG TAB    Take 1,300-1,625 mg by mouth 4 times a day. 4-5 times daily    AZITHROMYCIN (ZITHROMAX Z-MIKE) 250 MG TAB    2 tablets on day 1, then 1 tablet by mouth daily for the next 4 days    CELECOXIB (CELEBREX) 200 MG CAP    Take 1 Cap by mouth every day.    CYCLOBENZAPRINE (FLEXERIL) 5 MG TABLET    TAKE 1 TABLET BY MOUTH 3 TIMES A DAY AS NEEDED FOR MUSCLE SPASMS.    FLUOXETINE (PROZAC) 40 MG CAPSULE    TAKE ONE CAPSULE BY MOUTH ONCE DAILY    FLUTICASONE (FLONASE) 50 MCG/ACT NASAL SPRAY    Spray 2 Sprays in nose every day.    GABAPENTIN (NEURONTIN) 100 MG CAP    Take 100-200 mg by mouth every day. 100 mg every morning  200 mg at  "bedtime    HYDROCODONE-ACETAMINOPHEN (NORCO) 5-325 MG TAB PER TABLET    Take 1-2 Tabs by mouth every 6 hours as needed.    HYDROCODONE-ACETAMINOPHEN (NORCO) 7.5-325 MG PER TABLET    Take 1 Tab by mouth every 12 hours as needed for Severe Pain.    IBUPROFEN (MOTRIN) 600 MG TAB    Take 1 Tab by mouth every 6 hours as needed.    LEVOTHYROXINE (SYNTHROID) 150 MCG TAB    Take 1 Tab by mouth every morning before breakfast. ON EMPTY STOMACH    TRAMADOL (ULTRAM) 50 MG TAB    Take 1 Tab by mouth every 6 hours as needed.    ZOLPIDEM (AMBIEN) 5 MG TAB    Take 1-2 tablets by mouth every evening as needed for insomnia. Bring to sleep study.       ALLERGIES   Allergies   Allergen Reactions   • Nkda [No Known Drug Allergy]      PHYSICAL EXAM   Vital Signs: /94 mmHg  Pulse 117  Temp(Src) 37.8 °C (100 °F)  Resp 18  Ht 1.575 m (5' 2.01\")  Wt 72.1 kg (158 lb 15.2 oz)  BMI 29.07 kg/m2  SpO2 95%      Constitutional: Well developed, Well nourished, No acute distress, Non-toxic appearance.   HENT:  Right ear canal clear, there appears to be a rupture in the TM at about 10 o'clock.   Psychiatric: Calm. Not anxious.  Eyes: EOMI, Conjunctiva normal, No discharge.   Pulmonary: No respiratory distress. No stridor.  Abdomen: Soft nondistended and nontender.   Skin: Warm, Dry, No erythema, No rash.   : No CVA tenderness.   Neurologic: Alert & oriented.    DIAGNOSTIC STUDIES/PROCEDURES  None    COURSE & MEDICAL DECISION MAKING   Nursing notes, VS, [id number] reviewed in chart . I reviewed the chart patient had been seen and had been medicated she declines narcotics which she was not going to get anyway. At this point, because of her noncompliance will go and give intramuscular injection of antibiotics. No signs of meningitis, mastoiditis at this time.    Reviewed old medical records from 3/30/2017 that showed the final impression of patient being seen by Dr. Gunn in the ED. She was diagnosed with an ear infection and was started " on ear drops, a Z pack, Albuterol, and Norco.     8:16 PM - Patient was evaluated. The patient will be medicated with Rocephin for her symptoms. I discussed with the patient the plan to provide her with the prescriptions that she lost. She will return to the ED with any new or worsening symptoms. She was educated to avoid getting water in her ear as this may exacerbate her infection and pain.     The patient will return for new or worsening symptoms and is stable at the time of discharge.    The patient is referred to a primary physician for blood pressure management, diabetic screening, and for all other preventative health concerns.    DISPOSITION:  Patient will be discharged home in stable condition.    FOLLOW UP:  [facility], Emergency Dept  [facility] [facility] [id number] [phone]    If symptoms worsen    OUTPATIENT MEDICATIONS:  New Prescriptions    ALBUTEROL 108 (90 BASE) MCG/ACT AERO SOLN INHALATION AEROSOL    Inhale 2 Puffs by mouth every four hours as needed for Shortness of Breath.    AMOXICILLIN (AMOXIL) 500 MG CAP    Take 1 Cap by mouth 3 times a day for 7 days.     FINAL IMPRESSION   1. Medication refill    2. Ruptured tympanic membrane, right       Howie ZIMMER (Scribe), am scribing for, and in the presence of, Sean Romo, *.    Electronically signed by: Howie Carolina (Lexa), 3/31/2017    Sean ZIMMER, * personally performed the services described in this documentation, as scribed by Howie Carolina in my presence, and it is both accurate and complete.    The note accurately reflects work and decisions made by me.  Sean Romo  3/31/2017  9:59 PM

## 2017-04-01 NOTE — DISCHARGE INSTRUCTIONS
Eardrum Perforation  An eardrum perforation is a puncture or tear in the eardrum. This is also called a ruptured eardrum. The eardrum is a thin, round tissue inside of your ear that separates your ear canal from your middle ear. This is the tissue that detects sound and enables you to hear. An eardrum perforation can cause discomfort and hearing loss. In most cases, the eardrum will heal without treatment and with little or no permanent hearing loss.  CAUSES  An eardrum perforation can result from different causes, including:  · Sudden pressure changes that happen in situations such as scuba diving or flying in an airplane.  · Foreign objects in the ear.  · Inserting a cotton-tipped swab or any blunt object into the ear.  · Loud noise.  · Trauma to the ear.  · Attempting to remove an object from the ear.  SIGNS AND SYMPTOMS  · Hearing loss.  · Ear pain.  · Ringing in the ear.  · Discharge or bleeding from the ear.  · Dizziness.  · Vomiting.  · Facial paralysis.  DIAGNOSIS   Your health care provider will examine your ear using a tool called an otoscope. This tool allows the health care provider to see into your ear to examine your eardrum. Various types of hearing tests may also be done.  TREATMENT   Typically, the eardrum will heal on its own within a few weeks. If your eardrum does not heal, your health care provider may recommend one of the following treatments:  · A procedure to place a patch over your eardrum.  · Surgery to repair your eardrum.  HOME CARE INSTRUCTIONS   · Keep your ear dry. This will improve healing. Do not submerge your head under water until healing is complete. Do not swim or dive until your health care provider approves. While bathing or showering, protect your ear using one of these methods:  ¨ Using a waterproof earplug.  ¨ Covering a piece of cotton with petroleum jelly and placing it in your outer ear canal.  · Take medicines only as directed by your health care provider.  · Avoid  blowing your nose if possible. If you blow your nose, do it gently. Forceful blowing increases the pressure in your middle ear. This may cause further injury or may delay your healing.  · Resume your normal activities after the perforation has healed. Your health care provider can tell you when this has occurred.  · Talk to your health care provider before you fly on an airplane. Air travel is generally allowed with a perforated eardrum.  · Keep all follow-up visits as directed by your health care provider. This is important.  SEEK MEDICAL CARE IF:  · You have a fever.  SEEK IMMEDIATE MEDICAL CARE IF:  · You have blood or pus coming from your ear.  · You have dizziness or problems with balance.  · You have nausea or vomiting.  · You have increased pain.     This information is not intended to replace advice given to you by your health care provider. Make sure you discuss any questions you have with your health care provider.     Document Released: 12/15/2001 Document Revised: 01/08/2016 Document Reviewed: 07/27/2015  [employer] Interactive Patient Education ©2016 [employer]

## 2017-04-01 NOTE — DISCHARGE PLANNING
CM received call from pt requesting update regarding RX.  This CM confirmed with DAKOTAH Bella that RX were called into pts pharmacy.  CM relayed this information and for pt to contact her pharmacy to ensure medications are ready for

## 2017-05-25 ENCOUNTER — PATIENT OUTREACH (OUTPATIENT)
Dept: HEALTH INFORMATION MANAGEMENT | Facility: OTHER | Age: 47
End: 2017-05-25

## 2017-06-23 NOTE — PROGRESS NOTES
Outcome: Left Message    WebIZ Checked & Epic Updated:  yes    HealthConnect Verified: no    Attempt # 4

## 2018-03-14 ENCOUNTER — APPOINTMENT (OUTPATIENT)
Dept: MEDICAL GROUP | Facility: PHYSICIAN GROUP | Age: 48
End: 2018-03-14
Payer: MEDICARE

## 2018-03-30 ENCOUNTER — OFFICE VISIT (OUTPATIENT)
Dept: MEDICAL GROUP | Facility: PHYSICIAN GROUP | Age: 48
End: 2018-03-30
Payer: MEDICARE

## 2018-03-30 VITALS
HEART RATE: 90 BPM | BODY MASS INDEX: 28.88 KG/M2 | OXYGEN SATURATION: 95 % | WEIGHT: 163 LBS | DIASTOLIC BLOOD PRESSURE: 82 MMHG | SYSTOLIC BLOOD PRESSURE: 124 MMHG | HEIGHT: 63 IN | TEMPERATURE: 98.4 F

## 2018-03-30 DIAGNOSIS — E89.0 POSTOPERATIVE HYPOTHYROIDISM: ICD-10-CM

## 2018-03-30 DIAGNOSIS — M25.551 CHRONIC RIGHT HIP PAIN: ICD-10-CM

## 2018-03-30 DIAGNOSIS — F31.81 BIPOLAR 2 DISORDER (HCC): ICD-10-CM

## 2018-03-30 DIAGNOSIS — G89.29 CHRONIC RIGHT HIP PAIN: ICD-10-CM

## 2018-03-30 DIAGNOSIS — G47.01 INSOMNIA DUE TO MEDICAL CONDITION: ICD-10-CM

## 2018-03-30 PROCEDURE — 99214 OFFICE O/P EST MOD 30 MIN: CPT | Performed by: NURSE PRACTITIONER

## 2018-03-30 RX ORDER — DULOXETIN HYDROCHLORIDE 60 MG/1
60 CAPSULE, DELAYED RELEASE ORAL 2 TIMES DAILY
COMMUNITY
End: 2019-03-12 | Stop reason: SDUPTHER

## 2018-03-30 RX ORDER — TRAMADOL HYDROCHLORIDE 50 MG/1
50-100 TABLET ORAL
Qty: 14 TAB | Refills: 0 | Status: SHIPPED
Start: 2018-03-30 | End: 2018-04-06

## 2018-03-30 RX ORDER — LEVOTHYROXINE SODIUM 88 UG/1
88 TABLET ORAL
COMMUNITY
End: 2018-06-27

## 2018-03-30 RX ORDER — ZOLPIDEM TARTRATE 5 MG/1
5 TABLET ORAL NIGHTLY PRN
Qty: 14 TAB | Refills: 0 | Status: SHIPPED
Start: 2018-04-06 | End: 2018-04-20

## 2018-03-30 ASSESSMENT — PATIENT HEALTH QUESTIONNAIRE - PHQ9
CLINICAL INTERPRETATION OF PHQ2 SCORE: 4
SUM OF ALL RESPONSES TO PHQ QUESTIONS 1-9: 22
5. POOR APPETITE OR OVEREATING: 3 - NEARLY EVERY DAY

## 2018-03-30 NOTE — ASSESSMENT & PLAN NOTE
She had a fall June 2017 from pulling garden hose doing yardwork and she fell in a hole. She is following [facility] and has been told she will need hip replacement in about 5 years. The pain is right lateral upper leg into right buttocks. It is constant dull aching, or stabbing pain. Using icy hot, CBD oil, tizanidine, and diclofenac for pain. She is going to [employer] for water therapy 3 times weekly. MARILIN is giving her injections every 3 months.   She reports that she can tolerate this pain during the day just fine, but at night when she lays down for bed, she is having a terrible time sleeping due to the pain. She is requesting something to help her sleep at night or manage the pain

## 2018-03-30 NOTE — ASSESSMENT & PLAN NOTE
Ongoing chronic problem currently not controlled. Depression has worsened over this past 2 weeks. She is currently on lamictal and duloxetine for management.  This problem has been followed by [facility] Dr. Joselin Londono with appointment every 8 weeks, next appointment in 4 weeks she believes.  She saw her for emergency visit a few weeks ago because of anger and lamictal was increased and duloxetine was increased from 30 mg to 60 mg BID.   No improvement since she made these changes.   She goes to [facility] once weekly seeing Paola, next appointment April 7th.     PHQ 9 score: 22

## 2018-03-30 NOTE — ASSESSMENT & PLAN NOTE
Status of thyroid is unknown as this is being managed with the [facility] clinic therefore I do not have any of the lab test results to review.   Ref. Range 11/23/2016 15:42   TSH Latest Ref Range: 0.300 - 3.700 uIU/mL 60.530 (H)

## 2018-04-10 NOTE — PROGRESS NOTES
Subjective:     Chief Complaint   Patient presents with   • Hip Pain     x1year       HPI  Taylor Salamanca is a 47 y.o. female here today for hip pain that is causing her to lose sleep at night. She is here with someone who assists her to all of her doctors visits that she is currently living in an independent living facility due to the severity of her bipolar disorder.    Postoperative hypothyroidism  Status of thyroid is unknown as this is being managed with the [facility] clinic therefore I do not have any of the lab test results to review.   Ref. Range 11/23/2016 15:42   TSH Latest Ref Range: 0.300 - 3.700 uIU/mL 60.530 (H)       Bipolar 2 Disorder  Ongoing chronic problem currently not controlled. Depression has worsened over this past 2 weeks. She is currently on lamictal and duloxetine for management.  This problem has been followed by [facility] clinic Dr. Joselin Londono with appointment every 8 weeks, next appointment in 4 weeks she believes.  She saw her for emergency visit a few weeks ago because of anger and lamictal was increased and duloxetine was increased from 30 mg to 60 mg BID.   No improvement since she made these changes.   She goes to [facility] once weekly seeing Paola, next appointment April 7th.     PHQ 9 score: 22    Chronic right hip pain  She had a fall June 2017 from pulling garden hose doing yardwork and she fell in a hole. She is following [facility] and has been told she will need hip replacement in about 5 years. The pain is right lateral upper leg into right buttocks. It is constant dull aching, or stabbing pain. Using icy hot, CBD oil, tizanidine, and diclofenac for pain. She is going to [employer] for water therapy 3 times weekly. MARILIN is giving her injections every 3 months.   She reports that she can tolerate this pain during the day just fine, but at night when she lays down for bed, she is having a terrible time sleeping due to the pain. She is requesting something to  help her sleep at night or manage the pain       Diagnoses of Chronic right hip pain, Insomnia due to medical condition, Bipolar 2 disorder (CMS-HCC), and Postoperative hypothyroidism were pertinent to this visit.    Allergies: Nkda [no known drug allergy]  Current medicines (including changes today)  Current Outpatient Prescriptions   Medication Sig Dispense Refill   • LAMOTRIGINE PO Take  by mouth.     • DULoxetine (CYMBALTA) 60 MG Cap [facility] [employer] delayed-release capsule Take 60 mg by mouth every day.     • DICLOFENAC POTASSIUM PO Take  by mouth.     • levothyroxine (SYNTHROID) 88 MCG Tab Take 88 mcg by mouth Every morning on an empty stomach.     • zolpidem (AMBIEN) 5 MG Tab Take 1 Tab by mouth at bedtime as needed for Sleep for up to 14 days. 14 Tab 0   • gabapentin (NEURONTIN) 100 MG Cap Take 100-200 mg by mouth every day. 100 mg every morning  200 mg at bedtime     • albuterol 108 (90 BASE) MCG/ACT Aero Soln inhalation aerosol Inhale 2 Puffs by mouth every four hours as needed for Shortness of Breath. 1 Inhaler 1   • albuterol 108 (90 BASE) MCG/ACT Aero Soln inhalation aerosol Inhale 2 Puffs by mouth every four hours as needed for Shortness of Breath. 1 Inhaler 1   • acetic acid (VOSOL) 2 % otic solution Place 2 Drops in right ear 3 times a day. 1 Bottle 0   • fluticasone (FLONASE) 50 MCG/ACT nasal spray Spray 2 Sprays in nose every day.       No current facility-administered medications for this visit.        She  has a past medical history of Allergy; Aphthous stomatitis; Arthritis; Bipolar 2 disorder (CMS-HCC); Depression; Hyperlipidemia; LBP (low back pain); MEDICAL HOME; Narcolepsy and cataplexy; Neck pain; and Thyroid cancer (CMS-HCC) (1/2012). She also has no past medical history of Breast cancer (CMS-HCC) or Diabetes.        ROS  As stated in HPI and additionally  Gen: +fatigue and insomnia. No F/C, or weight loss  MSK: No joint erythema, edema, or warmth  CV: No chest pain or LE edema        "Objective:     Blood pressure 124/82, pulse 90, temperature 36.9 °C (98.4 °F), height 1.588 m (5' 2.5\"), weight 73.9 kg (163 lb), SpO2 95 %. Body mass index is 29.34 kg/m².  Physical Exam:  General: Alert, oriented, in no acute distress.  Eye contact is good, speech goal directed, affect calm and flat  CNs grossly intact.  HEENT: conjunctiva non-injected, sclera non-icteric, EOMs intact.   Gross hearing intact.  Neck: Supple. No adenopathy or masses in the cervical or supraclavicular regions. No thyromegaly   Lungs: unlabored. clear to auscultation bilaterally with good excursion.  CV: regular rate and rhythm. No murmurs.   Ext: no edema, normal color and temperature.   Skin: No rashes or lesions in visible areas  Gait steady.     Assessment and Plan:   Assessment/Plan:  1. Chronic right hip pain  Not controlled. RECORDS REQUESTED from [facility]. We will do a one-week trial of tramadol at bedtime for pain. She will also do a one-week trial after this with Ambien to assist with sleep. We will follow up in 2-3 weeks for her to let me know what helps with her pain to allow her to sleep. During the day she is managing pain conservatively well and does not need any narcotic daytime.  - tramadol (ULTRAM) 50 MG Tab; Take 1-2 Tabs by mouth at bedtime as needed for Severe Pain for up to 7 days.  Dispense: 14 Tab; Refill: 0    2. Insomnia due to medical condition  See #1  - zolpidem (AMBIEN) 5 MG Tab; Take 1 Tab by mouth at bedtime as needed for Sleep for up to 14 days.  Dispense: 14 Tab; Refill: 0    3. Bipolar 2 disorder (CMS-HCC)  Not controlled. This is being monitored by another provider. I requested she call to schedule another follow-up visit within the next 2 weeks with [facility]. She lives in an independent living facility where she does not have any weapons and is under supervision, therefore I feel she is a low suicide risk.  - Patient has been identified as being depressed and appropriate orders and counseling have " been given    4. Postoperative hypothyroidism  Status unknown. I have asked that she bring in records from health clinic if possible.    The total time spent seeing the patient in consultation, and formulating an action plan for this visit was 25 minutes.  Greater than 50% of this time was spent face to face counseling, discussing problems documented above, coordinating care and answering questions by the provider.          Follow up:  Return in about 3 weeks (around 4/20/2018).    Educated in proper administration of medication(s) ordered today including safety, possible SE, risks, benefits, rationale and alternatives to therapy.   Supportive care, differential diagnoses, and indications for immediate follow-up discussed with patient.    Pathogenesis of diagnosis discussed including typical length and natural progression.    Instructed to return to clinic or nearest emergency department for any change in condition, further concerns, or worsening of symptoms.  Patient states understanding of the plan of care and discharge instructions.      Please note that this dictation was created using voice recognition software. I have made every reasonable attempt to correct obvious errors, but I expect that there are errors of grammar and possibly content that I did not discover before finalizing the note.    Followup: Return in about 3 weeks (around 4/20/2018). sooner should new symptoms or problems arise.

## 2018-04-23 ENCOUNTER — OFFICE VISIT (OUTPATIENT)
Dept: MEDICAL GROUP | Facility: PHYSICIAN GROUP | Age: 48
End: 2018-04-23
Payer: MEDICARE

## 2018-04-23 VITALS
TEMPERATURE: 97.7 F | WEIGHT: 174 LBS | BODY MASS INDEX: 30.83 KG/M2 | SYSTOLIC BLOOD PRESSURE: 106 MMHG | HEART RATE: 88 BPM | RESPIRATION RATE: 14 BRPM | DIASTOLIC BLOOD PRESSURE: 70 MMHG | HEIGHT: 63 IN | OXYGEN SATURATION: 96 %

## 2018-04-23 DIAGNOSIS — F51.05 INSOMNIA DUE TO MENTAL CONDITION: ICD-10-CM

## 2018-04-23 DIAGNOSIS — G89.29 CHRONIC RIGHT HIP PAIN: ICD-10-CM

## 2018-04-23 DIAGNOSIS — F31.81 BIPOLAR 2 DISORDER (HCC): ICD-10-CM

## 2018-04-23 DIAGNOSIS — M25.551 CHRONIC RIGHT HIP PAIN: ICD-10-CM

## 2018-04-23 DIAGNOSIS — R10.9 RIGHT FLANK PAIN: ICD-10-CM

## 2018-04-23 DIAGNOSIS — Z79.891 CHRONIC USE OF OPIATE DRUGS THERAPEUTIC PURPOSES: ICD-10-CM

## 2018-04-23 PROCEDURE — 99214 OFFICE O/P EST MOD 30 MIN: CPT | Performed by: NURSE PRACTITIONER

## 2018-04-23 RX ORDER — HYDROXYZINE HYDROCHLORIDE 25 MG/1
12.5-25 TABLET, FILM COATED ORAL
Qty: 90 TAB | Refills: 1 | Status: ON HOLD | OUTPATIENT
Start: 2018-04-23 | End: 2018-07-12

## 2018-04-23 RX ORDER — TIZANIDINE 4 MG/1
4 TABLET ORAL
COMMUNITY
End: 2019-03-12

## 2018-04-23 RX ORDER — ZOLPIDEM TARTRATE 10 MG/1
5-10 TABLET ORAL NIGHTLY PRN
Qty: 12 TAB | Refills: 0 | Status: SHIPPED
Start: 2018-04-23 | End: 2018-05-23

## 2018-04-23 RX ORDER — TRAMADOL HYDROCHLORIDE 50 MG/1
50-100 TABLET ORAL
Qty: 60 TAB | Refills: 0 | Status: SHIPPED
Start: 2018-04-23 | End: 2018-05-18 | Stop reason: SDUPTHER

## 2018-04-23 NOTE — ASSESSMENT & PLAN NOTE
Ongoing chronic problem currently stable, that was not controlled 3 weeks ago. Depression has improved. She is planning on moving on May 1 out of the independent living situation she is currently in. She will be living in a room with her friend, and her son has a new dog for her. Continues to follow psychiatry through [facility]

## 2018-04-23 NOTE — ASSESSMENT & PLAN NOTE
Chronic problem since a fall in June 2017. She is following [facility]. Complains of pain in right lateral upper leg and right buttock region described as dull aching alternating with stabbing pain. Using water therapy at [facility], icy hot, type tizanidine, diclofenac, and CBD oil. 3 weeks ago I prescribed tramadol for pain for her to use at night which has offered significant improvement to allow her to sleep. MARILIN is giving her injections about every 3 months.

## 2018-04-23 NOTE — ASSESSMENT & PLAN NOTE
She hurt her hip and has not been active. She can barely walk half a block. All she was doing at the independent living home was eat and sit.

## 2018-04-25 PROBLEM — G47.01 INSOMNIA DUE TO MEDICAL CONDITION: Status: ACTIVE | Noted: 2018-04-25

## 2018-04-25 RX ORDER — ALBUTEROL SULFATE 90 UG/1
2 AEROSOL, METERED RESPIRATORY (INHALATION) EVERY 4 HOURS PRN
Qty: 1 INHALER | Refills: 1 | Status: SHIPPED | OUTPATIENT
Start: 2018-04-25 | End: 2021-06-13

## 2018-04-25 RX ORDER — FLUTICASONE PROPIONATE 50 MCG
2 SPRAY, SUSPENSION (ML) NASAL DAILY
Qty: 3 BOTTLE | Refills: 3 | Status: SHIPPED | OUTPATIENT
Start: 2018-04-25 | End: 2019-03-12

## 2018-04-26 NOTE — ASSESSMENT & PLAN NOTE
Patient reports significant improvement with sleep with the addition of tramadol and Ambien to assist with her pain and insomnia at bedtime. She is feeling significantly improved now that she is able to sleep through the night without pain

## 2018-04-26 NOTE — ASSESSMENT & PLAN NOTE
Analgesia: 2-3/10  Activity level: fair  Adverse events: none  Aberrant behavior: none  Affect and mood: calm and pleasant today   Nonnarcotic treatments that are being used: see hip pain HPI  Pain management agreement initiated and signed on: today  Most recent urine drug screen done never. Will obtain next visit  Opiate risk score: 2  Total daily opiate dosage: morphine 10 mEq

## 2018-04-26 NOTE — PROGRESS NOTES
Subjective:     Chief Complaint   Patient presents with   • Pain     medication review, arthritis pain, back pain   • Medication Refill     albuterol, flonase, sleep and pain medication        HPI  Taylor Salamanca is a 47 y.o. female here today for f/u on pain and insomnia     Bipolar 2 Disorder  Ongoing chronic problem currently stable, that was not controlled 3 weeks ago. Depression has improved. She is planning on moving on May 1 out of the independent living situation she is currently in. She will be living in a room with her friend, and her son has a new dog for her. Continues to follow psychiatry through [facility]    BMI 31.0-31.9,adult  She hurt her hip and has not been active. She can barely walk half a block. All she was doing at the independent living home was eat and sit.     Chronic right hip pain  Chronic problem since a fall in June 2017. She is following [facility]. Complains of pain in right lateral upper leg and right buttock region described as dull aching alternating with stabbing pain. Using water therapy at [facility], icy hot, type tizanidine, diclofenac, and CBD oil. 3 weeks ago I prescribed tramadol for pain for her to use at night which has offered significant improvement to allow her to sleep. [facility] is giving her injections about every 3 months.        Chronic use of opiate drugs therapeutic purposes  Analgesia: 2-3/10  Activity level: fair  Adverse events: none  Aberrant behavior: none  Affect and mood: calm and pleasant today   Nonnarcotic treatments that are being used: see hip pain HPI  Pain management agreement initiated and signed on: today  Most recent urine drug screen done never. Will obtain next visit  Opiate risk score: 2  Total daily opiate dosage: morphine 10 mEq          Insomnia due to medical condition  Patient reports significant improvement with sleep with the addition of tramadol and Ambien to assist with her pain and insomnia at bedtime. She is  feeling significantly improved now that she is able to sleep through the night without pain       Diagnoses of Chronic right hip pain, Insomnia due to mental condition, Bipolar 2 disorder (CMS-[facility]), Right flank pain, BMI 31.0-31.9,adult, and Chronic use of opiate drugs therapeutic purposes were pertinent to this visit.    Allergies: Nkda [no known drug allergy]  Current medicines (including changes today)  Current Outpatient Prescriptions   Medication Sig Dispense Refill   • albuterol 108 (90 Base) MCG/ACT Aero Soln inhalation aerosol Inhale 2 Puffs by mouth every four hours as needed for Shortness of Breath. 1 Inhaler 1   • fluticasone (FLONASE) 50 MCG/ACT nasal spray Spray 2 Sprays in nose every day. 3 Bottle 3   • tizanidine (ZANAFLEX) 4 MG Tab Take 4 mg by mouth every 6 hours as needed.     • tramadol (ULTRAM) 50 MG Tab Take 1-2 Tabs by mouth at bedtime as needed for Severe Pain for up to 30 days. 60 Tab 0   • hydrOXYzine HCl (ATARAX) 25 MG Tab Take 0.5-1 Tabs by mouth at bedtime as needed (sleep). 90 Tab 1   • zolpidem (AMBIEN) 10 MG Tab Take 0.5-1 Tabs by mouth at bedtime as needed for Sleep (max use 3 nights/week) for up to 30 days. 12 Tab 0   • DULoxetine (CYMBALTA) 60 MG Cap DR Particles delayed-release capsule Take 60 mg by mouth every day.     • DICLOFENAC POTASSIUM PO Take  by mouth.     • levothyroxine (SYNTHROID) 88 MCG Tab Take 88 mcg by mouth Every morning on an empty stomach.     • gabapentin (NEURONTIN) 100 MG Cap Take 100-200 mg by mouth every day. 100 mg every morning  200 mg at bedtime       No current facility-administered medications for this visit.        She  has a past medical history of Allergy; Aphthous stomatitis; Arthritis; Bipolar 2 disorder (CMS-[facility]); Depression; Hyperlipidemia; LBP (low back pain); MEDICAL HOME; Narcolepsy and cataplexy; Neck pain; and Thyroid cancer (CMS-HCC) (1/2012). She also has no past medical history of Breast cancer (CMS-HCC) or Diabetes.        ROS  As stated  "in HPI     Objective:     Blood pressure 106/70, pulse 88, temperature 36.5 °C (97.7 °F), resp. rate 14, height 1.588 m (5' 2.5\"), weight 78.9 kg (174 lb), SpO2 96 %. Body mass index is 31.32 kg/m².  Physical Exam:  General: Alert, oriented, in no acute distress.  Eye contact is good, speech goal directed, affect calm and pleasant. Mood improved from 3 weeks ago   CNs grossly intact.  HEENT: conjunctiva non-injected, sclera non-icteric, EOMs intact. PERRL. No lid edema or eye drainage.   Gross hearing intact.  Ext: no edema, normal color   Skin: No rashes or lesions in visible areas  Gait steady.     Assessment and Plan:   Assessment/Plan:  1. Chronic right hip pain  Improving with tramadol at bedtime. f/u 3 months. Records requested from [facility] to see their future plan for her   - tramadol (ULTRAM) 50 MG Tab; Take 1-2 Tabs by mouth at bedtime as needed for Severe Pain for up to 30 days.  Dispense: 60 Tab; Refill: 0    2. Insomnia due to mental condition  Stable. Monitor.   - hydrOXYzine HCl (ATARAX) 25 MG Tab; Take 0.5-1 Tabs by mouth at bedtime as needed (sleep).  Dispense: 90 Tab; Refill: 1  - zolpidem (AMBIEN) 10 MG Tab; Take 0.5-1 Tabs by mouth at bedtime as needed for Sleep (max use 3 nights/week) for up to 30 days.  Dispense: 12 Tab; Refill: 0    3. Bipolar 2 disorder (CMS-HCC)  Currently stable f/u with psych     4. Right flank pain  UA negative, suspect r/t muscular etiology rec heating pad  - POCT Urinalysis    5. BMI 31.0-31.9,adult  Continue with water therapy for exercise  - Patient identified as having weight management issue.  Appropriate orders and counseling given.    6. Chronic use of opiate drugs therapeutic purposes  In prescribing controlled substances to this patient, I certify that I have obtained and reviewed the medical history of Taylor Salamanca. I have also made a good maxwell effort to obtain applicable records from other providers who have treated the patient and records demonstrating the " following: bipolar higher risk   I have conducted a physical exam and documented it. I have reviewed Ms. Salamanca’s prescription history as maintained by the [place] Prescription Monitoring Program.   I have assessed the patient’s risk for abuse, dependency, and addiction using the validated Opioid Risk Tool available at https://www.mdcalc.com/jauswr-qmls-pffw-ort-narcotic-abuse.   Given the above, I believe the benefits of controlled substance therapy outweigh the risks. The reasons for prescribing controlled substances include non-narcotic, oral analgesic alternatives have been inadequate for pain control. Accordingly, I have discussed the risk and benefits, treatment plan, and alternative therapies with the patient.   - Controlled Substance Treatment Agreement  UDS due next visit     The total time spent seeing the patient in consultation, and formulating an action plan for this visit was 25 minutes.  Greater than 50% of this time was spent face to face counseling, discussing problems documented above, coordinating care and answering questions by the provider.          Follow up:  Return in about 3 months (around 7/23/2018).    Educated in proper administration of medication(s) ordered today including safety, possible SE, risks, benefits, rationale and alternatives to therapy.   Supportive care, differential diagnoses, and indications for immediate follow-up discussed with patient.    Pathogenesis of diagnosis discussed including typical length and natural progression.    Instructed to return to clinic or nearest emergency department for any change in condition, further concerns, or worsening of symptoms.  Patient states understanding of the plan of care and discharge instructions.      Please note that this dictation was created using voice recognition software. I have made every reasonable attempt to correct obvious errors, but I expect that there are errors of grammar and possibly content that I did not discover  before finalizing the note.    Followup: Return in about 3 months (around 7/23/2018). sooner should new symptoms or problems arise.

## 2018-05-07 ENCOUNTER — APPOINTMENT (OUTPATIENT)
Dept: RADIOLOGY | Facility: MEDICAL CENTER | Age: 48
End: 2018-05-07
Attending: EMERGENCY MEDICINE
Payer: MEDICARE

## 2018-05-07 ENCOUNTER — HOSPITAL ENCOUNTER (EMERGENCY)
Facility: MEDICAL CENTER | Age: 48
End: 2018-05-07
Attending: EMERGENCY MEDICINE
Payer: MEDICARE

## 2018-05-07 VITALS
SYSTOLIC BLOOD PRESSURE: 106 MMHG | BODY MASS INDEX: 28.8 KG/M2 | HEART RATE: 105 BPM | WEIGHT: 160 LBS | OXYGEN SATURATION: 90 % | RESPIRATION RATE: 18 BRPM | DIASTOLIC BLOOD PRESSURE: 76 MMHG | TEMPERATURE: 98.7 F

## 2018-05-07 DIAGNOSIS — M54.50 ACUTE MIDLINE LOW BACK PAIN WITHOUT SCIATICA: ICD-10-CM

## 2018-05-07 PROCEDURE — 72128 CT CHEST SPINE W/O DYE: CPT

## 2018-05-07 PROCEDURE — 96375 TX/PRO/DX INJ NEW DRUG ADDON: CPT

## 2018-05-07 PROCEDURE — 700111 HCHG RX REV CODE 636 W/ 250 OVERRIDE (IP): Performed by: EMERGENCY MEDICINE

## 2018-05-07 PROCEDURE — 304561 HCHG STAT O2

## 2018-05-07 PROCEDURE — 96374 THER/PROPH/DIAG INJ IV PUSH: CPT

## 2018-05-07 PROCEDURE — 72131 CT LUMBAR SPINE W/O DYE: CPT

## 2018-05-07 PROCEDURE — 99284 EMERGENCY DEPT VISIT MOD MDM: CPT

## 2018-05-07 RX ORDER — KETOROLAC TROMETHAMINE 30 MG/ML
30 INJECTION, SOLUTION INTRAMUSCULAR; INTRAVENOUS ONCE
Status: COMPLETED | OUTPATIENT
Start: 2018-05-07 | End: 2018-05-07

## 2018-05-07 RX ORDER — MORPHINE SULFATE 4 MG/ML
4 INJECTION, SOLUTION INTRAMUSCULAR; INTRAVENOUS ONCE
Status: COMPLETED | OUTPATIENT
Start: 2018-05-07 | End: 2018-05-07

## 2018-05-07 RX ORDER — METHYLPREDNISOLONE 4 MG/1
TABLET ORAL
Qty: 1 KIT | Refills: 0 | Status: SHIPPED | OUTPATIENT
Start: 2018-05-07 | End: 2018-06-14

## 2018-05-07 RX ORDER — ONDANSETRON 2 MG/ML
4 INJECTION INTRAMUSCULAR; INTRAVENOUS ONCE
Status: COMPLETED | OUTPATIENT
Start: 2018-05-07 | End: 2018-05-07

## 2018-05-07 RX ORDER — NAPROXEN 500 MG/1
500 TABLET ORAL 2 TIMES DAILY WITH MEALS
Qty: 20 TAB | Refills: 0 | Status: SHIPPED | OUTPATIENT
Start: 2018-05-07 | End: 2018-05-18 | Stop reason: SDUPTHER

## 2018-05-07 RX ADMIN — KETOROLAC TROMETHAMINE 30 MG: 30 INJECTION, SOLUTION INTRAMUSCULAR at 16:15

## 2018-05-07 RX ADMIN — MORPHINE SULFATE 4 MG: 4 INJECTION INTRAVENOUS at 14:12

## 2018-05-07 RX ADMIN — ONDANSETRON 4 MG: 2 INJECTION, SOLUTION INTRAMUSCULAR; INTRAVENOUS at 14:12

## 2018-05-07 ASSESSMENT — PAIN SCALES - GENERAL
PAINLEVEL_OUTOF10: 7
PAINLEVEL_OUTOF10: 6
PAINLEVEL_OUTOF10: 10
PAINLEVEL_OUTOF10: 9
PAINLEVEL_OUTOF10: 10
PAINLEVEL_OUTOF10: 10

## 2018-05-07 NOTE — ED NOTES
Pt was medicated for pain prior to discharge. PIV removed. Catheter intact. Dressing applied. Bleeding controlled. D/C instructions reviewed in detail with pt. Pt states understanding. Scripts reviewed in detail and given x2. Pt to lobby to notify MTM for ride home.

## 2018-05-07 NOTE — ED TRIAGE NOTES
Pt to triage in wheelchair  Pt reports she helped moving for a [employer]  Chief Complaint   Patient presents with   • Back Pain     x months but worse the last two days   takes tramadol at home for back pain  Pt asked to wait in lobby, pt updated on triage process and pt asked to inform RN of any changes.

## 2018-05-07 NOTE — ED NOTES
"Pt roomed from [facility] via . Pt states a friend dropped her off. Pt presents for symptoms as stated in the triage note. Pt states she has a hx of LBP. Pt was  her granddaughter yesterday and realized yesterday evening when she got home that she had over done it throughout the day. Pt states she is incontinent of urine. CMS intact to BLE. Cap refill 3 seconds to BLE. Pt has some difficulty lifting the R leg which she states us normal for her. Pt is able to lift L leg without difficulty off the bed. Pt was able to pivot herself to the bed. Provided assistance with changing pt into a gown. Pt was able to lay herself back in the bed. Pt reports no position of comfort. Pt is able to change positions in bed by self from a supine to a R side lying and back. Pt states \"I just want a shot I am not here for pain meds\". Informed pt that ERP would need to see her first. Pt started to moan when this RN left the room.     "

## 2018-05-07 NOTE — ED PROVIDER NOTES
ED Provider Note    CHIEF COMPLAINT  Chief Complaint   Patient presents with   • Back Pain     x months but worse the last two days       HPI  Taylor Salamanca is a 47 y.o. female who presents lower back pain. The patient states that back started to really hurt yesterday after lifting her 20 pound grandchild. She has chronic low back pain into her right hip but this is much worse. She says that she's come by private car but it took a long time to get the car to get here. He had a movement makes the pain severe is still mixed somewhat better. She's taken to tramadol already today. She takes for chronic hip pain. She denies to me other complaints including to me loss of bowel or bladder weakness or numbness. She states she has chronic right hip pain that limits her range of motion of the right hip only.  Risk factors for epidural abscess or hematoma. She denies any IV drug abuse, diabetes, recent infections spine manipulation.    REVIEW OF SYSTEMS  CONSTITUTIONAL:  Denies fever, chills, or weakness  EYES:  Denies discharge   ENT:  Denies sore throat, nose or ear pain  CARDIOVASCULAR:  Denies chest pain, palpitations or swelling  RESPIRATORY:  Denies cough or shortness of breath or difficulty breathing  GI:  Denies abdominal pain,  vomiting, or diarrhea  MUSCULOSKELETAL:  Denies weakness joint swelling. Positive mid low back pain with pain into her right hip. Severe nature. She feels a crunching.  SKIN:  No rash  ALLERGIC: No itchy rashes.  NEUROLOGIC:  Denies headache, focal weakness or numbness. To me she denies loss of bowel or bladder  PSYCHIATRIC:  Denies depression    PAST MEDICAL HISTORY  Past Medical History:   Diagnosis Date   • Allergy    • Aphthous stomatitis    • Arthritis    • Bipolar 2 disorder (HCC)    • Depression    • Hyperlipidemia    • LBP (low back pain)    • MEDICAL HOME    • Narcolepsy and cataplexy    • Neck pain    • Thyroid cancer (HCC) 1/2012    followed by [facility].  papillary cancer  treated with surgery.  will start chemo       FAMILY HISTORY  Family History   Problem Relation Age of Onset   • Psychiatry Father    • Alcohol/Drug Sister    • Alcohol/Drug Brother    • Heart Disease Maternal Grandmother    • Bipolar disorder Maternal Grandmother    • Depression Maternal Grandmother    • Heart Disease Maternal Grandfather    • Lung Disease Paternal Grandmother    • Lung Disease Paternal Grandfather    • Lung Disease Mother      sleep apnea   • Depression Mother        SOCIAL HISTORY   reports that she has been smoking Cigarettes.  She has a 7.50 pack-year smoking history. She has never used smokeless tobacco. She reports that she drinks alcohol. She reports that she does not use drugs.    SURGICAL HISTORY  Past Surgical History:   Procedure Laterality Date   • SUBMANDIBLE ABSCESS INCISION AND DRAINAGE Right 8/11/2016    Procedure: SUBMANDIBLE ABSCESS INCISION AND DRAINAGE;  Surgeon: Chun Newman D.D.S.;  Location: SURGERY [facility];  Service:    • DENTAL EXTRACTION(S) Right 8/11/2016    Procedure: DENTAL EXTRACTION(S);  Surgeon: Chun Newman D.D.S.;  Location: SURGERY [facility];  Service:    • THYROIDECTOMY TOTAL  3/14/2012    Performed by JARED MAX at SURGERY SAME DAY [facility] ORS   • NODE DISSECTION  3/14/2012    Performed by JARED MAX at SURGERY SAME DAY [facility] ORS   • BREAST BIOPSY      with benign breast mass removal   • GYN SURGERY      lumpectomy   • TUBAL LIGATION         CURRENT MEDICATIONS  Home Medications     Reviewed by Christin Tesfaye R.N. (Registered Nurse) on 05/07/18 at 1327  Med List Status: <None>   Medication Last Dose Status   albuterol 108 (90 Base) MCG/ACT Aero Soln inhalation aerosol  Active   DICLOFENAC POTASSIUM PO 4/23/2018 Active   DULoxetine (CYMBALTA) 60 MG Cap DR Particles delayed-release capsule 4/23/2018 Active   fluticasone (FLONASE) 50 MCG/ACT nasal spray  Active   gabapentin (NEURONTIN) 100 MG Cap 4/23/2018 Active   hydrOXYzine  HCl (ATARAX) 25 MG Tab  Active   levothyroxine (SYNTHROID) 88 MCG Tab 4/23/2018 Active   tizanidine (ZANAFLEX) 4 MG Tab 4/23/2018 Active   tramadol (ULTRAM) 50 MG Tab  Active   zolpidem (AMBIEN) 10 MG Tab  Active                ALLERGIES  Allergies   Allergen Reactions   • Nkda [No Known Drug Allergy]        PHYSICAL EXAM  VITAL SIGNS: /81   Pulse (!) 105   Temp 36.7 °C (98 °F) (Temporal)   Resp 18   Wt 72.6 kg (160 lb)   SpO2 95%   BMI 28.80 kg/m²      Constitutional: Patient is awake alert person place and time. No acute respiratory distress Well developed, Well nourished, laying on her back stating that her back hurts quite a bit you can hear her only new pain.  HENT: Normocephalic, Atraumatic,  Bilateral external ears normal, Oropharynx pink moist with no exudates, Nose patent.   Eyes:  Sclera and conjunctiva clear, No discharge.   Neck:  Supple no nuchal rigidity, no thyromegaly or mass. Non-tender  Lymphatic: No supraclavicular,  Cardiovascular: Heart is regular rate and rhythm no murmur, rub or thrill.   Thorax & Lungs: Chest is symmetrical, with good breath sounds. No wheezing or crackles. No respiratory distress,   Abdomen:  Soft, No tenderness no hepatosplenomegaly there is no guarding or rebound, No masses, No pulsatile masses.   Skin: She does have some bruising in her left mid arm she states is from a dog biting her. They are not open wounds. No gross deformity to her back.   Back: Tenderness in her mid low back and muscle spasms. There is no gross deformities to the spine itself that I can feel.   xtremities: No  edema.  Non tender.   Musculoskeletal: Good range of motion wrists, elbows, shoulders,  knees, ankles pulses 2+ radially and femorally.   She does have tenderness with range of motion of the right hip which is says is chronic. She can't get surgery for 5 years because of her insurance and her age. She states this is not new.  Neurologic: Alert & oriented to person, place, and time.   Strength is 5 over 5 , bicep triceps, left quadricep bilateral, plantar flexion and extension. Right quadricep is limited secondary to pain in her hip which is chronic. Sensory is intact to light touch face, arms and legs. DTRs are symmetrical biceps brachioradialis, patella and Achilles.   Psychiatric:  Very anxious.        RADIOLOGY/PROCEDURES  CT-LSPINE W/O PLUS RECONS   Final Result      1.  Severe multilevel degenerative disc disease and facet degeneration.      2.  Mild multilevel degenerative retrolisthesis.      CT-TSPINE W/O PLUS RECONS   Final Result      No acute fracture of the thoracic spine. Multilevel arthritic changes involving the disc spaces.            COURSE & MEDICAL DECISION MAKING  Pertinent Labs & Imaging studies reviewed. (See chart for details)  Differential diagnosis includes but not limited to epidural abscess, epidural hematoma, musculoskeletal, cancer, disc injury    Patient who states that she's had chronic back pain but this is worse now she takes Ultram usually 4. Comes midthoracic lower back pain similar to what she's had before. I did not feel she had risk factors for epidural abscess or hematoma. No IV drug abuse no recent infections no manipulation of her spine or injections to dental infections not on any anticoagulants. She states that she did lift an upper 20+ pound grandchild the day before her back started hurting again.    Recheck prior to discharge as she states she is feeling better she is moving her legs around. Pulse rate by me was 86    Emergency room we did obtain x-rays of her spine as much pain she was eliciting. And it shows that she has a lot of very severe degenerative joint disease. We have given her pain medications here and Toradol. She'll be discharged on steroids. She states that Naprosyn seems to work for her. She wanted a prescription for narcotics but I explained I could not do so since she is already had a few prescriptions from her primary care  doctors and is currently taking Ultram. She seems to understand this. I do believe she needs close follow-up and pain management as an outpatient.  Believe her tachycardia was secondary to pain she was having.  I believe she is stable for discharge      FINAL IMPRESSION  1. Back pain acute exacerbation of chronic back pain  2. Chronic right hip pain     PLAN  1. Follow-up with her primary care doctor within 2 days  2. Medrol Dosepak/Naprosyn  3. Back pain information sheet  4. Return to the emergency department for increased pains, fevers, vomiting or change in condition.  Electronically signed by: Kwesi Espinoza, 5/7/2018 2:08 PM

## 2018-05-14 ENCOUNTER — TELEPHONE (OUTPATIENT)
Dept: MEDICAL GROUP | Facility: PHYSICIAN GROUP | Age: 48
End: 2018-05-14

## 2018-05-14 DIAGNOSIS — M51.36 DDD (DEGENERATIVE DISC DISEASE), LUMBAR: ICD-10-CM

## 2018-05-14 NOTE — TELEPHONE ENCOUNTER
I have seen them. Who is she seeing at [facility]? Are they doing epidurals or anything for spine?    DOMENIC Penny.

## 2018-05-14 NOTE — TELEPHONE ENCOUNTER
Patient called she stated she was recently in the ER for back pain. They did a CT Scan and gave her tramadol for pain but she states it is not working for the pain. She wanted to see if you can request copy's of those CT scans for yourself. Please advice?

## 2018-05-15 NOTE — TELEPHONE ENCOUNTER
Patient stated she is seeing Dr Hawley at [facility] and that they want her to wait three months before giving her shots in her hips. She Also stated she is not seeing anybody for her spine they told her they would refer her but she has not heard from anybody. She also stated she is walking but is still in a lot of pain due to her hip and tramadol is not helping nor is tylenol.

## 2018-05-15 NOTE — TELEPHONE ENCOUNTER
I have placed referral to get her in to see a spine specialist. If Tramadol is not helping, she needs appointment for further pain medication management until she can be seen by spine specialist.    DOMENIC Penny.

## 2018-05-18 ENCOUNTER — NON-PROVIDER VISIT (OUTPATIENT)
Dept: MEDICAL GROUP | Facility: PHYSICIAN GROUP | Age: 48
End: 2018-05-18
Payer: MEDICARE

## 2018-05-18 ENCOUNTER — TELEPHONE (OUTPATIENT)
Dept: MEDICAL GROUP | Facility: PHYSICIAN GROUP | Age: 48
End: 2018-05-18

## 2018-05-18 DIAGNOSIS — G89.29 CHRONIC RIGHT HIP PAIN: ICD-10-CM

## 2018-05-18 DIAGNOSIS — M54.50 CHRONIC BILATERAL LOW BACK PAIN WITHOUT SCIATICA: ICD-10-CM

## 2018-05-18 DIAGNOSIS — G89.29 CHRONIC BILATERAL LOW BACK PAIN WITHOUT SCIATICA: ICD-10-CM

## 2018-05-18 DIAGNOSIS — M25.551 CHRONIC RIGHT HIP PAIN: ICD-10-CM

## 2018-05-18 RX ORDER — KETOROLAC TROMETHAMINE 30 MG/ML
60 INJECTION, SOLUTION INTRAMUSCULAR; INTRAVENOUS ONCE
Status: COMPLETED | OUTPATIENT
Start: 2018-05-18 | End: 2018-05-18

## 2018-05-18 RX ORDER — HYDROCODONE BITARTRATE AND ACETAMINOPHEN 7.5; 325 MG/1; MG/1
1-2 TABLET ORAL EVERY 8 HOURS PRN
Qty: 30 TAB | Refills: 0 | Status: SHIPPED | OUTPATIENT
Start: 2018-05-18 | End: 2018-06-14 | Stop reason: SDUPTHER

## 2018-05-18 RX ORDER — NAPROXEN 500 MG/1
500 TABLET ORAL 2 TIMES DAILY WITH MEALS
Qty: 180 TAB | Refills: 1 | Status: SHIPPED | OUTPATIENT
Start: 2018-05-18 | End: 2018-06-12 | Stop reason: SDUPTHER

## 2018-05-18 RX ORDER — TRAMADOL HYDROCHLORIDE 50 MG/1
50-100 TABLET ORAL
Qty: 60 TAB | Refills: 0 | Status: SHIPPED
Start: 2018-05-24 | End: 2018-05-18

## 2018-05-18 RX ADMIN — KETOROLAC TROMETHAMINE 60 MG: 30 INJECTION, SOLUTION INTRAMUSCULAR; INTRAVENOUS at 16:37

## 2018-05-18 NOTE — TELEPHONE ENCOUNTER
1. Caller Name: Taylor Salamanca                                         Call Back Number: [phone] (home)       Patient approves a detailed voicemail message: yes    Can you please order the Toradol injection for MA visit at 4:30 pm Today thank you

## 2018-05-18 NOTE — PROGRESS NOTES
Taylor Salamanca is a 47 y.o. female here for a non-provider visit for Toradol injection.    Reason for injection: pain Right leg  Order in MAR?: Yes  Patient supplied?:No  Minimum interval has been met for this injection (per MAR order): Yes    Order and dose verified by: LGE  Patient tolerated injection and no adverse effects were observed or reported: Yes    # of Administrations remaining in MAR: NA

## 2018-06-12 ENCOUNTER — TELEPHONE (OUTPATIENT)
Dept: MEDICAL GROUP | Facility: PHYSICIAN GROUP | Age: 48
End: 2018-06-12

## 2018-06-12 DIAGNOSIS — G89.29 CHRONIC BILATERAL LOW BACK PAIN WITHOUT SCIATICA: ICD-10-CM

## 2018-06-12 DIAGNOSIS — M54.50 CHRONIC BILATERAL LOW BACK PAIN WITHOUT SCIATICA: ICD-10-CM

## 2018-06-12 DIAGNOSIS — M25.551 CHRONIC RIGHT HIP PAIN: ICD-10-CM

## 2018-06-12 DIAGNOSIS — G89.29 CHRONIC RIGHT HIP PAIN: ICD-10-CM

## 2018-06-12 RX ORDER — HYDROCODONE BITARTRATE AND ACETAMINOPHEN 7.5; 325 MG/1; MG/1
1-2 TABLET ORAL EVERY 8 HOURS PRN
Qty: 30 TAB | Refills: 0 | Status: CANCELLED | OUTPATIENT
Start: 2018-06-12 | End: 2018-07-12

## 2018-06-12 RX ORDER — NAPROXEN 500 MG/1
500 TABLET ORAL 2 TIMES DAILY WITH MEALS
Qty: 180 TAB | Refills: 0 | Status: SHIPPED | OUTPATIENT
Start: 2018-06-12 | End: 2018-10-23 | Stop reason: SDUPTHER

## 2018-06-12 NOTE — TELEPHONE ENCOUNTER
Patient called she stated she is already out of her pain medication and still has two weeks to go before her hip surgy.

## 2018-06-12 NOTE — TELEPHONE ENCOUNTER
Double book her if need be preferably in morning because by law I cannot give her Norco without seeing her. I did it before my trip to [facility] because of the circumstances, but she needs to be in person here to get this next one.    DOMENIC Penny.

## 2018-06-14 ENCOUNTER — OFFICE VISIT (OUTPATIENT)
Dept: MEDICAL GROUP | Facility: PHYSICIAN GROUP | Age: 48
End: 2018-06-14
Payer: MEDICARE

## 2018-06-14 VITALS
TEMPERATURE: 99 F | HEART RATE: 87 BPM | BODY MASS INDEX: 31.71 KG/M2 | WEIGHT: 179 LBS | SYSTOLIC BLOOD PRESSURE: 118 MMHG | DIASTOLIC BLOOD PRESSURE: 62 MMHG | HEIGHT: 63 IN | OXYGEN SATURATION: 96 %

## 2018-06-14 DIAGNOSIS — M25.551 CHRONIC RIGHT HIP PAIN: ICD-10-CM

## 2018-06-14 DIAGNOSIS — Z79.891 CHRONIC USE OF OPIATE DRUGS THERAPEUTIC PURPOSES: ICD-10-CM

## 2018-06-14 DIAGNOSIS — G89.29 CHRONIC RIGHT HIP PAIN: ICD-10-CM

## 2018-06-14 PROCEDURE — 99213 OFFICE O/P EST LOW 20 MIN: CPT | Performed by: NURSE PRACTITIONER

## 2018-06-14 RX ORDER — HYDROCODONE BITARTRATE AND ACETAMINOPHEN 7.5; 325 MG/1; MG/1
1-2 TABLET ORAL EVERY 12 HOURS PRN
Qty: 120 TAB | Refills: 0 | Status: ON HOLD | OUTPATIENT
Start: 2018-06-14 | End: 2018-07-12

## 2018-06-14 RX ORDER — TRAMADOL HYDROCHLORIDE 50 MG/1
50 TABLET ORAL EVERY 4 HOURS PRN
COMMUNITY
End: 2018-06-27

## 2018-06-14 NOTE — LETTER
June 19, 2018         Patient: Taylor Salamanca   YOB: 1970   Date of Visit: 6/14/2018           To Whom it May Concern:    Taylor Salamanca was seen in my clinic on 6/14/2018. She is medically cleared for right hip surgery. Please notify me if I need to do any pre-op evaluation testing.     If you have any questions or concerns, please don't hesitate to call.        Sincerely,           DOMENIC Penny.  Electronically Signed

## 2018-06-15 NOTE — ASSESSMENT & PLAN NOTE
Last dose of narcotic medication: last dose Tramadol 2 weeks ago, and last dose of Norco 2-3 days ago   Analgesia: good with Norco  Activity level: fair  Adverse events: none  Aberrant behavior: none  Affect and mood: calm and pleasant  Nonnarcotic treatments that are being used: see HPI   Pain management agreement initiated and signed on: 4/23/18  Most recent urine drug screen done never  Opiate risk score: 2  Total daily opiate dosage: max 30 mEq morphine

## 2018-06-15 NOTE — ASSESSMENT & PLAN NOTE
Chronic problem since a fall June 2017. She is following [facility] and they want to do a THR on 7/16. She needs a clearance and then they will get this process going. The pain is right lateral upper leg and right buttock region described as dull and aching alternating with stabbing pain. Using icy hot, tizanidine, diclofenac, and CBD oil. [facility] has been giving her injections every 3 months. I had given her Norco, but she has run out of this. Pain is severe.

## 2018-06-19 ENCOUNTER — TELEPHONE (OUTPATIENT)
Dept: MEDICAL GROUP | Facility: PHYSICIAN GROUP | Age: 48
End: 2018-06-19

## 2018-06-20 NOTE — TELEPHONE ENCOUNTER
Phone Number Called: [phone] (home)     Message: [facility] sent a letter stating patient is having a right total hip arthroplasty procedure. Dr. Hawley is requiring primary care clearance. I called patient to make a pre op appointment but she states she just saw James 5 days ago and that [facility] told her that James just needs to sign paperwork. Patient is hesitant to come in as she is in a lot of pain, patient has not made appointment for procedure yet, the closest appointment that our providers have is until early July. Is there something we can do for her?    Left Message for patient to call back: no

## 2018-06-20 NOTE — TELEPHONE ENCOUNTER
I have not received it. I wrote a letter for medical clearance though to be faxed over today already.     DOMENIC Penny.

## 2018-06-21 NOTE — PROGRESS NOTES
Subjective:     Chief Complaint   Patient presents with   • Hip Pain     pain med refill       HPI  Taylor Salamanca is a 47 y.o. female here today for pain medication    Chronic right hip pain  Chronic problem since a fall June 2017. She is following [facility] and they want to do a THR on 7/16. She needs a clearance and then they will get this process going. The pain is right lateral upper leg and right buttock region described as dull and aching alternating with stabbing pain. Using icy hot, tizanidine, diclofenac, and CBD oil. [facility] has been giving her injections every 3 months. I had given her Norco, but she has run out of this. Pain is severe.     Chronic use of opiate drugs therapeutic purposes  Last dose of narcotic medication: last dose Tramadol 2 weeks ago, and last dose of Norco 2-3 days ago   Analgesia: good with Norco  Activity level: fair  Adverse events: none  Aberrant behavior: none  Affect and mood: calm and pleasant  Nonnarcotic treatments that are being used: see HPI   Pain management agreement initiated and signed on: 4/23/18  Most recent urine drug screen done never  Opiate risk score: 2  Total daily opiate dosage: max 30 mEq morphine      Diagnoses of Chronic right hip pain and Chronic use of opiate drugs therapeutic purposes were pertinent to this visit.    Allergies: Nkda [no known drug allergy]  Current medicines (including changes today)  Current Outpatient Prescriptions   Medication Sig Dispense Refill   • tramadol (ULTRAM) 50 MG Tab Take 50 mg by mouth every four hours as needed.     • ZOLPIDEM TARTRATE PO Take  by mouth.     • HYDROcodone-acetaminophen (NORCO) 7.5-325 MG per tablet Take 1-2 Tabs by mouth every 12 hours as needed for Severe Pain (max 4 tabs/day. Stop Tramadol) for up to 30 days. 120 Tab 0   • naproxen (NAPROSYN) 500 MG Tab Take 1 Tab by mouth 2 times a day, with meals. 180 Tab 0   • tizanidine (ZANAFLEX) 4 MG Tab Take 4 mg by mouth every 6 hours as needed.     • hydrOXYzine HCl  "(ATARAX) 25 MG Tab Take 0.5-1 Tabs by mouth at bedtime as needed (sleep). 90 Tab 1   • DULoxetine (CYMBALTA) 60 MG Cap DR Particles delayed-release capsule Take 60 mg by mouth every day.     • levothyroxine (SYNTHROID) 88 MCG Tab Take 88 mcg by mouth Every morning on an empty stomach.     • gabapentin (NEURONTIN) 100 MG Cap Take 100-200 mg by mouth every day. 100 mg every morning  200 mg at bedtime     • albuterol 108 (90 Base) MCG/ACT Aero Soln inhalation aerosol Inhale 2 Puffs by mouth every four hours as needed for Shortness of Breath. 1 Inhaler 1   • fluticasone (FLONASE) 50 MCG/ACT nasal spray Spray 2 Sprays in nose every day. 3 Bottle 3     No current facility-administered medications for this visit.        She  has a past medical history of Allergy; Aphthous stomatitis; Arthritis; Bipolar 2 disorder (HCC); Depression; Hyperlipidemia; LBP (low back pain); MEDICAL HOME; Narcolepsy and cataplexy; Neck pain; and Thyroid cancer ([facility]) (1/2012). She also has no past medical history of Breast cancer ([facility]) or Diabetes.        ROS  As stated in HPI and additionally       Objective:     Blood pressure 118/62, pulse 87, temperature 37.2 °C (99 °F), height 1.588 m (5' 2.5\"), weight 81.2 kg (179 lb), SpO2 96 %. Body mass index is 32.22 kg/m².  Physical Exam:  General: Alert, oriented, in no acute distress.  Eye contact is good, speech goal directed, affect calm  CNs grossly intact.  HEENT: conjunctiva non-injected, sclera non-icteric, EOMs intact.   Gross hearing intact.  Ext: no edema, normal color and temperature.   Skin: No rashes or lesions in visible areas  Gait steady with small lump right leg.     Assessment and Plan:   Assessment/Plan:  1. Chronic right hip pain  Not controlled. Required increased dose of pain medication. This should last her until her total hip replacement. Letter sent to [facility] with Dr. Hawley to inform him she is cleared for surgery   - HYDROcodone-acetaminophen (NORCO) 7.5-325 MG per tablet; Take " 1-2 Tabs by mouth every 12 hours as needed for Severe Pain (max 4 tabs/day. Stop Tramadol) for up to 30 days.  Dispense: 120 Tab; Refill: 0    2. Chronic use of opiate drugs therapeutic purposes  Obtained and reviewed the patient utilization report state pharmacy database on 6/20/2018.  Based on assessment of report prescription for Norco is medically necessary. Patient has not requested any early refills and exhibits no abberant behavior. I have advised patient to keep medication and safe place and to not drive, use alcohol, or take illicit drugs while taking this medication.  - [facility] PAIN MANAGEMENT SCREEN; Future       Follow up:  Return post-op .    Educated in proper administration of medication(s) ordered today including safety, possible SE, risks, benefits, rationale and alternatives to therapy.   Supportive care, differential diagnoses, and indications for immediate follow-up discussed with patient.    Pathogenesis of diagnosis discussed including typical length and natural progression.    Instructed to return to clinic or nearest emergency department for any change in condition, further concerns, or worsening of symptoms.  Patient states understanding of the plan of care and discharge instructions.      Please note that this dictation was created using voice recognition software. I have made every reasonable attempt to correct obvious errors, but I expect that there are errors of grammar and possibly content that I did not discover before finalizing the note.    Followup: Return post-op . sooner should new symptoms or problems arise.

## 2018-06-27 DIAGNOSIS — Z01.812 PRE-OPERATIVE LABORATORY EXAMINATION: ICD-10-CM

## 2018-06-27 DIAGNOSIS — Z01.810 PRE-OPERATIVE CARDIOVASCULAR EXAMINATION: ICD-10-CM

## 2018-06-27 LAB
ANION GAP SERPL CALC-SCNC: 8 MMOL/L (ref 0–11.9)
BUN SERPL-MCNC: 20 MG/DL (ref 8–22)
CALCIUM SERPL-MCNC: 9.3 MG/DL (ref 8.5–10.5)
CHLORIDE SERPL-SCNC: 103 MMOL/L (ref 96–112)
CO2 SERPL-SCNC: 28 MMOL/L (ref 20–33)
CREAT SERPL-MCNC: 0.86 MG/DL (ref 0.5–1.4)
EKG IMPRESSION: NORMAL
ERYTHROCYTE [DISTWIDTH] IN BLOOD BY AUTOMATED COUNT: 46.5 FL (ref 35.9–50)
GLUCOSE SERPL-MCNC: 93 MG/DL (ref 65–99)
HCT VFR BLD AUTO: 36.2 % (ref 37–47)
HGB BLD-MCNC: 11.9 G/DL (ref 12–16)
MCH RBC QN AUTO: 31.1 PG (ref 27–33)
MCHC RBC AUTO-ENTMCNC: 32.9 G/DL (ref 33.6–35)
MCV RBC AUTO: 94.5 FL (ref 81.4–97.8)
PLATELET # BLD AUTO: 277 K/UL (ref 164–446)
PMV BLD AUTO: 10.3 FL (ref 9–12.9)
POTASSIUM SERPL-SCNC: 3.9 MMOL/L (ref 3.6–5.5)
RBC # BLD AUTO: 3.83 M/UL (ref 4.2–5.4)
SODIUM SERPL-SCNC: 139 MMOL/L (ref 135–145)
WBC # BLD AUTO: 6.4 K/UL (ref 4.8–10.8)

## 2018-06-27 PROCEDURE — 85730 THROMBOPLASTIN TIME PARTIAL: CPT

## 2018-06-27 PROCEDURE — 93010 ELECTROCARDIOGRAM REPORT: CPT | Performed by: INTERNAL MEDICINE

## 2018-06-27 PROCEDURE — 87641 MR-STAPH DNA AMP PROBE: CPT

## 2018-06-27 PROCEDURE — 83036 HEMOGLOBIN GLYCOSYLATED A1C: CPT

## 2018-06-27 PROCEDURE — 85610 PROTHROMBIN TIME: CPT

## 2018-06-27 PROCEDURE — 85027 COMPLETE CBC AUTOMATED: CPT

## 2018-06-27 PROCEDURE — 36415 COLL VENOUS BLD VENIPUNCTURE: CPT

## 2018-06-27 PROCEDURE — 87640 STAPH A DNA AMP PROBE: CPT | Mod: XU

## 2018-06-27 PROCEDURE — 80048 BASIC METABOLIC PNL TOTAL CA: CPT

## 2018-06-27 PROCEDURE — 93005 ELECTROCARDIOGRAM TRACING: CPT

## 2018-06-27 PROCEDURE — G0475 HIV COMBINATION ASSAY: HCPCS

## 2018-06-27 RX ORDER — LEVOTHYROXINE SODIUM 0.1 MG/1
100 TABLET ORAL
COMMUNITY
End: 2018-10-01

## 2018-06-27 NOTE — DISCHARGE PLANNING
DISCHARGE PLANNING NOTE - TOTAL JOINT     Procedure: Procedure(s):  HIP ARTHROPLASTY TOTAL  Procedure Date: 7/11/2018  Insurance:  Payor: MEDICARE / Plan: MEDICARE PART A & B / Product Type: *No Product type* /   Equipment currently available at home? front-wheel walker  Steps into the home? 4  Steps within the home? 0  Toilet height? Standard  Type of shower? tub-shower  Who will be with you during your recovery? friend  Is Outpatient Physical Therapy set up after surgery? No   Did you take the Total Joint Class and where? No     Plan: The patient was provided a copy of [facility] Guide to Joinb Replacement

## 2018-06-27 NOTE — OR NURSING
No sign sof UTI.  Hx of MRSA- no open wounds at present  Had a sleep study and was told she needed a CPAP but never went to pick it up.Stated in hx that uses uses oxygen prn , when questioned about it , she stated that she does not have a provider and only uses it when in the hospital.

## 2018-06-28 ENCOUNTER — TELEPHONE (OUTPATIENT)
Dept: MEDICAL GROUP | Facility: PHYSICIAN GROUP | Age: 48
End: 2018-06-28

## 2018-06-28 LAB
APTT PPP: 32.7 SEC (ref 24.7–36)
EST. AVERAGE GLUCOSE BLD GHB EST-MCNC: 111 MG/DL
HBA1C MFR BLD: 5.5 % (ref 0–5.6)
HIV 1+2 AB+HIV1 P24 AG SERPL QL IA: NON REACTIVE
INR PPP: 0.93 (ref 0.87–1.13)
PROTHROMBIN TIME: 12.2 SEC (ref 12–14.6)
SCCMEC + MECA PNL NOSE NAA+PROBE: NEGATIVE
SCCMEC + MECA PNL NOSE NAA+PROBE: NEGATIVE

## 2018-06-28 NOTE — LETTER
June 28, 2018         Patient: Taylor Salamanca   YOB: 1970   Date of Visit: 6/28/2018           To Whom it May Concern:    I am primary care provider for MS. Taylor Salamanca. I understand that she will be having surgery in July on her hip. She does have a pain contract with myself, but due to acute postop pain and needs, I would like to weeks of postop medication to be taken care of by orthopedics if they are okay with doing so and then I will take over from there.    If you have any questions or concerns, please don't hesitate to call.        Sincerely,           DOMENIC Penny.  Electronically Signed

## 2018-06-29 NOTE — TELEPHONE ENCOUNTER
Please informed patient we will fax a letter requesting that they take care of her pain for the first 2 weeks and then I will take over from thereafter. Please get her scheduled for a follow-up with me 3 weeks after her surgery    DOMENIC Penny.

## 2018-06-29 NOTE — TELEPHONE ENCOUNTER
Patient called to inform you that she is going to be having her hip surgery on July 11 @ 4 Pm at [facility]. She does have a question for you because at her visit with them they inform her that she needed to get her pain medication after the surgery with you since she sign the contract with you. But another person told her that she would be getting the pain mediation from them with the exception that you fax over an order stating they can fill her pain medication. She would like to know what will happen after surgery regarding pain medication refill and wants to get an answer with you before surgery. Please advice?

## 2018-07-11 ENCOUNTER — HOSPITAL ENCOUNTER (INPATIENT)
Facility: MEDICAL CENTER | Age: 48
LOS: 1 days | DRG: 470 | End: 2018-07-12
Attending: ORTHOPAEDIC SURGERY | Admitting: ORTHOPAEDIC SURGERY
Payer: MEDICARE

## 2018-07-11 ENCOUNTER — APPOINTMENT (OUTPATIENT)
Dept: RADIOLOGY | Facility: MEDICAL CENTER | Age: 48
DRG: 470 | End: 2018-07-11
Attending: PHYSICIAN ASSISTANT
Payer: MEDICARE

## 2018-07-11 DIAGNOSIS — M25.551 CHRONIC RIGHT HIP PAIN: ICD-10-CM

## 2018-07-11 DIAGNOSIS — G89.18 POST-OP PAIN: ICD-10-CM

## 2018-07-11 DIAGNOSIS — G89.29 CHRONIC RIGHT HIP PAIN: ICD-10-CM

## 2018-07-11 DIAGNOSIS — M16.11 PRIMARY OSTEOARTHRITIS OF RIGHT HIP: ICD-10-CM

## 2018-07-11 LAB — PATHOLOGY CONSULT NOTE: NORMAL

## 2018-07-11 PROCEDURE — 700102 HCHG RX REV CODE 250 W/ 637 OVERRIDE(OP): Performed by: PHYSICIAN ASSISTANT

## 2018-07-11 PROCEDURE — 160036 HCHG PACU - EA ADDL 30 MINS PHASE I: Performed by: ORTHOPAEDIC SURGERY

## 2018-07-11 PROCEDURE — 160031 HCHG SURGERY MINUTES - 1ST 30 MINS LEVEL 5: Performed by: ORTHOPAEDIC SURGERY

## 2018-07-11 PROCEDURE — 160035 HCHG PACU - 1ST 60 MINS PHASE I: Performed by: ORTHOPAEDIC SURGERY

## 2018-07-11 PROCEDURE — 700101 HCHG RX REV CODE 250

## 2018-07-11 PROCEDURE — 160048 HCHG OR STATISTICAL LEVEL 1-5: Performed by: ORTHOPAEDIC SURGERY

## 2018-07-11 PROCEDURE — 700102 HCHG RX REV CODE 250 W/ 637 OVERRIDE(OP)

## 2018-07-11 PROCEDURE — 501411 HCHG SPONGE, BABY LAP W/O RINGS: Performed by: ORTHOPAEDIC SURGERY

## 2018-07-11 PROCEDURE — 700102 HCHG RX REV CODE 250 W/ 637 OVERRIDE(OP): Performed by: ORTHOPAEDIC SURGERY

## 2018-07-11 PROCEDURE — 88311 DECALCIFY TISSUE: CPT

## 2018-07-11 PROCEDURE — 88304 TISSUE EXAM BY PATHOLOGIST: CPT

## 2018-07-11 PROCEDURE — 501838 HCHG SUTURE GENERAL: Performed by: ORTHOPAEDIC SURGERY

## 2018-07-11 PROCEDURE — A9272 DISP WOUND SUCT, DRSG/ACCESS: HCPCS | Performed by: PHYSICIAN ASSISTANT

## 2018-07-11 PROCEDURE — A9270 NON-COVERED ITEM OR SERVICE: HCPCS | Performed by: PHYSICIAN ASSISTANT

## 2018-07-11 PROCEDURE — 160002 HCHG RECOVERY MINUTES (STAT): Performed by: ORTHOPAEDIC SURGERY

## 2018-07-11 PROCEDURE — 700105 HCHG RX REV CODE 258: Performed by: PHYSICIAN ASSISTANT

## 2018-07-11 PROCEDURE — 502578 HCHG PACK, TOTAL HIP: Performed by: ORTHOPAEDIC SURGERY

## 2018-07-11 PROCEDURE — 700101 HCHG RX REV CODE 250: Performed by: PHYSICIAN ASSISTANT

## 2018-07-11 PROCEDURE — 160042 HCHG SURGERY MINUTES - EA ADDL 1 MIN LEVEL 5: Performed by: ORTHOPAEDIC SURGERY

## 2018-07-11 PROCEDURE — 502000 HCHG MISC OR IMPLANTS RC 0278: Performed by: ORTHOPAEDIC SURGERY

## 2018-07-11 PROCEDURE — A9270 NON-COVERED ITEM OR SERVICE: HCPCS | Performed by: ORTHOPAEDIC SURGERY

## 2018-07-11 PROCEDURE — 0SR906A REPLACEMENT OF RIGHT HIP JOINT WITH OXIDIZED ZIRCONIUM ON POLYETHYLENE SYNTHETIC SUBSTITUTE, UNCEMENTED, OPEN APPROACH: ICD-10-PCS | Performed by: ORTHOPAEDIC SURGERY

## 2018-07-11 PROCEDURE — 700111 HCHG RX REV CODE 636 W/ 250 OVERRIDE (IP): Performed by: PHYSICIAN ASSISTANT

## 2018-07-11 PROCEDURE — 72170 X-RAY EXAM OF PELVIS: CPT

## 2018-07-11 PROCEDURE — A9270 NON-COVERED ITEM OR SERVICE: HCPCS

## 2018-07-11 PROCEDURE — 160009 HCHG ANES TIME/MIN: Performed by: ORTHOPAEDIC SURGERY

## 2018-07-11 PROCEDURE — 770001 HCHG ROOM/CARE - MED/SURG/GYN PRIV*

## 2018-07-11 PROCEDURE — 700111 HCHG RX REV CODE 636 W/ 250 OVERRIDE (IP)

## 2018-07-11 PROCEDURE — 700105 HCHG RX REV CODE 258: Performed by: ORTHOPAEDIC SURGERY

## 2018-07-11 PROCEDURE — 302135 SEQUENTIAL COMPRESSION MACHINE: Performed by: ORTHOPAEDIC SURGERY

## 2018-07-11 DEVICE — IMPLANT OXINIUM FEM HD 12/14 36 MM M/+4 (1EA): Type: IMPLANTABLE DEVICE | Site: HIP | Status: FUNCTIONAL

## 2018-07-11 DEVICE — IMPLANT REF SPHER HEAD SCREW 20MM (1EA): Type: IMPLANTABLE DEVICE | Site: HIP | Status: FUNCTIONAL

## 2018-07-11 DEVICE — IMPLANT R3 20 DEG XLPE ACET LNR 36MM X 52MM: Type: IMPLANTABLE DEVICE | Site: HIP | Status: FUNCTIONAL

## 2018-07-11 DEVICE — IMPLANT REF SPHER HEAD SCREW 30MM (1EA): Type: IMPLANTABLE DEVICE | Site: HIP | Status: FUNCTIONAL

## 2018-07-11 DEVICE — STEM POLAR CEMENTLESS WITH COLLAR 1: Type: IMPLANTABLE DEVICE | Site: HIP | Status: FUNCTIONAL

## 2018-07-11 DEVICE — IMPLANT REF SPHER HEAD SCREW 15MM (1EA): Type: IMPLANTABLE DEVICE | Site: HIP | Status: FUNCTIONAL

## 2018-07-11 DEVICE — IMPLANTABLE DEVICE: Type: IMPLANTABLE DEVICE | Site: HIP | Status: FUNCTIONAL

## 2018-07-11 RX ORDER — LEVOTHYROXINE SODIUM 0.1 MG/1
100 TABLET ORAL
Status: DISCONTINUED | OUTPATIENT
Start: 2018-07-12 | End: 2018-07-12 | Stop reason: HOSPADM

## 2018-07-11 RX ORDER — TRANEXAMIC ACID 100 MG/ML
INJECTION, SOLUTION INTRAVENOUS
Status: DISPENSED
Start: 2018-07-11 | End: 2018-07-12

## 2018-07-11 RX ORDER — KETOROLAC TROMETHAMINE 30 MG/ML
INJECTION, SOLUTION INTRAMUSCULAR; INTRAVENOUS
Status: DISCONTINUED | OUTPATIENT
Start: 2018-07-11 | End: 2018-07-11 | Stop reason: HOSPADM

## 2018-07-11 RX ORDER — SCOLOPAMINE TRANSDERMAL SYSTEM 1 MG/1
1 PATCH, EXTENDED RELEASE TRANSDERMAL
Status: DISCONTINUED | OUTPATIENT
Start: 2018-07-11 | End: 2018-07-12 | Stop reason: HOSPADM

## 2018-07-11 RX ORDER — KETOROLAC TROMETHAMINE 30 MG/ML
15 INJECTION, SOLUTION INTRAMUSCULAR; INTRAVENOUS EVERY 6 HOURS
Status: DISCONTINUED | OUTPATIENT
Start: 2018-07-11 | End: 2018-07-12 | Stop reason: HOSPADM

## 2018-07-11 RX ORDER — ALBUTEROL SULFATE 90 UG/1
2 AEROSOL, METERED RESPIRATORY (INHALATION) EVERY 4 HOURS PRN
Status: DISCONTINUED | OUTPATIENT
Start: 2018-07-11 | End: 2018-07-12 | Stop reason: HOSPADM

## 2018-07-11 RX ORDER — DEXAMETHASONE SODIUM PHOSPHATE 4 MG/ML
8 INJECTION, SOLUTION INTRA-ARTICULAR; INTRALESIONAL; INTRAMUSCULAR; INTRAVENOUS; SOFT TISSUE ONCE
Status: COMPLETED | OUTPATIENT
Start: 2018-07-12 | End: 2018-07-12

## 2018-07-11 RX ORDER — DULOXETIN HYDROCHLORIDE 30 MG/1
60 CAPSULE, DELAYED RELEASE ORAL 2 TIMES DAILY
Status: DISCONTINUED | OUTPATIENT
Start: 2018-07-11 | End: 2018-07-12 | Stop reason: HOSPADM

## 2018-07-11 RX ORDER — ACETAMINOPHEN 500 MG
TABLET ORAL
Status: COMPLETED
Start: 2018-07-11 | End: 2018-07-11

## 2018-07-11 RX ORDER — BISACODYL 10 MG
10 SUPPOSITORY, RECTAL RECTAL
Status: DISCONTINUED | OUTPATIENT
Start: 2018-07-11 | End: 2018-07-12 | Stop reason: HOSPADM

## 2018-07-11 RX ORDER — GABAPENTIN 400 MG/1
400 CAPSULE ORAL EVERY EVENING
Status: DISCONTINUED | OUTPATIENT
Start: 2018-07-11 | End: 2018-07-12 | Stop reason: HOSPADM

## 2018-07-11 RX ORDER — ENEMA 19; 7 G/133ML; G/133ML
1 ENEMA RECTAL
Status: DISCONTINUED | OUTPATIENT
Start: 2018-07-11 | End: 2018-07-12 | Stop reason: HOSPADM

## 2018-07-11 RX ORDER — DEXTROSE MONOHYDRATE, SODIUM CHLORIDE, AND POTASSIUM CHLORIDE 50; 1.49; 4.5 G/1000ML; G/1000ML; G/1000ML
INJECTION, SOLUTION INTRAVENOUS CONTINUOUS
Status: DISCONTINUED | OUTPATIENT
Start: 2018-07-11 | End: 2018-07-12

## 2018-07-11 RX ORDER — HALOPERIDOL 5 MG/ML
1 INJECTION INTRAMUSCULAR EVERY 6 HOURS PRN
Status: DISCONTINUED | OUTPATIENT
Start: 2018-07-11 | End: 2018-07-12 | Stop reason: HOSPADM

## 2018-07-11 RX ORDER — TRANEXAMIC ACID
POWDER (GRAM) MISCELLANEOUS
Status: DISCONTINUED | OUTPATIENT
Start: 2018-07-11 | End: 2018-07-11 | Stop reason: HOSPADM

## 2018-07-11 RX ORDER — AMOXICILLIN 250 MG
1 CAPSULE ORAL
Status: DISCONTINUED | OUTPATIENT
Start: 2018-07-11 | End: 2018-07-12 | Stop reason: HOSPADM

## 2018-07-11 RX ORDER — OXYCODONE HYDROCHLORIDE 10 MG/1
10 TABLET ORAL
Status: DISCONTINUED | OUTPATIENT
Start: 2018-07-11 | End: 2018-07-12 | Stop reason: HOSPADM

## 2018-07-11 RX ORDER — DIPHENHYDRAMINE HCL 25 MG
25 TABLET ORAL NIGHTLY PRN
Status: DISCONTINUED | OUTPATIENT
Start: 2018-07-12 | End: 2018-07-12 | Stop reason: HOSPADM

## 2018-07-11 RX ORDER — ZOLPIDEM TARTRATE 5 MG/1
10 TABLET ORAL NIGHTLY PRN
Status: DISCONTINUED | OUTPATIENT
Start: 2018-07-11 | End: 2018-07-12 | Stop reason: HOSPADM

## 2018-07-11 RX ORDER — HYDROMORPHONE HYDROCHLORIDE 2 MG/ML
0.5 INJECTION, SOLUTION INTRAMUSCULAR; INTRAVENOUS; SUBCUTANEOUS
Status: DISCONTINUED | OUTPATIENT
Start: 2018-07-11 | End: 2018-07-12 | Stop reason: HOSPADM

## 2018-07-11 RX ORDER — CELECOXIB 200 MG/1
CAPSULE ORAL
Status: COMPLETED
Start: 2018-07-11 | End: 2018-07-11

## 2018-07-11 RX ORDER — OXYCODONE HYDROCHLORIDE 5 MG/1
5 TABLET ORAL
Status: DISCONTINUED | OUTPATIENT
Start: 2018-07-11 | End: 2018-07-12 | Stop reason: HOSPADM

## 2018-07-11 RX ORDER — AMOXICILLIN 250 MG
1 CAPSULE ORAL NIGHTLY
Status: DISCONTINUED | OUTPATIENT
Start: 2018-07-11 | End: 2018-07-12 | Stop reason: HOSPADM

## 2018-07-11 RX ORDER — POLYETHYLENE GLYCOL 3350 17 G/17G
1 POWDER, FOR SOLUTION ORAL 2 TIMES DAILY PRN
Status: DISCONTINUED | OUTPATIENT
Start: 2018-07-11 | End: 2018-07-12 | Stop reason: HOSPADM

## 2018-07-11 RX ORDER — DIPHENHYDRAMINE HYDROCHLORIDE 50 MG/ML
25 INJECTION INTRAMUSCULAR; INTRAVENOUS EVERY 6 HOURS PRN
Status: DISCONTINUED | OUTPATIENT
Start: 2018-07-11 | End: 2018-07-12 | Stop reason: HOSPADM

## 2018-07-11 RX ORDER — BUPIVACAINE HYDROCHLORIDE AND EPINEPHRINE 2.5; 5 MG/ML; UG/ML
INJECTION, SOLUTION INFILTRATION; PERINEURAL
Status: DISCONTINUED | OUTPATIENT
Start: 2018-07-11 | End: 2018-07-11 | Stop reason: HOSPADM

## 2018-07-11 RX ORDER — FLUTICASONE PROPIONATE 50 MCG
2 SPRAY, SUSPENSION (ML) NASAL DAILY
Status: DISCONTINUED | OUTPATIENT
Start: 2018-07-12 | End: 2018-07-12 | Stop reason: HOSPADM

## 2018-07-11 RX ORDER — TIZANIDINE 4 MG/1
4 TABLET ORAL 2 TIMES DAILY
Status: DISCONTINUED | OUTPATIENT
Start: 2018-07-11 | End: 2018-07-12 | Stop reason: HOSPADM

## 2018-07-11 RX ORDER — SODIUM CHLORIDE, SODIUM LACTATE, POTASSIUM CHLORIDE, CALCIUM CHLORIDE 600; 310; 30; 20 MG/100ML; MG/100ML; MG/100ML; MG/100ML
1000 INJECTION, SOLUTION INTRAVENOUS
Status: DISCONTINUED | OUTPATIENT
Start: 2018-07-11 | End: 2018-07-11

## 2018-07-11 RX ORDER — DIAZEPAM 5 MG/1
5 TABLET ORAL EVERY 6 HOURS PRN
Status: DISCONTINUED | OUTPATIENT
Start: 2018-07-11 | End: 2018-07-12 | Stop reason: HOSPADM

## 2018-07-11 RX ORDER — GABAPENTIN 100 MG/1
100-200 CAPSULE ORAL DAILY
Status: DISCONTINUED | OUTPATIENT
Start: 2018-07-12 | End: 2018-07-11

## 2018-07-11 RX ORDER — ONDANSETRON 2 MG/ML
4 INJECTION INTRAMUSCULAR; INTRAVENOUS EVERY 4 HOURS PRN
Status: DISCONTINUED | OUTPATIENT
Start: 2018-07-11 | End: 2018-07-12 | Stop reason: HOSPADM

## 2018-07-11 RX ORDER — ONDANSETRON 4 MG/1
4 TABLET, ORALLY DISINTEGRATING ORAL EVERY 4 HOURS PRN
Status: DISCONTINUED | OUTPATIENT
Start: 2018-07-11 | End: 2018-07-12 | Stop reason: HOSPADM

## 2018-07-11 RX ORDER — DOCUSATE SODIUM 100 MG/1
100 CAPSULE, LIQUID FILLED ORAL 2 TIMES DAILY
Status: DISCONTINUED | OUTPATIENT
Start: 2018-07-11 | End: 2018-07-12 | Stop reason: HOSPADM

## 2018-07-11 RX ORDER — DEXAMETHASONE SODIUM PHOSPHATE 4 MG/ML
4 INJECTION, SOLUTION INTRA-ARTICULAR; INTRALESIONAL; INTRAMUSCULAR; INTRAVENOUS; SOFT TISSUE
Status: DISCONTINUED | OUTPATIENT
Start: 2018-07-11 | End: 2018-07-12 | Stop reason: HOSPADM

## 2018-07-11 RX ORDER — GABAPENTIN 300 MG/1
CAPSULE ORAL
Status: COMPLETED
Start: 2018-07-11 | End: 2018-07-11

## 2018-07-11 RX ORDER — ACETAMINOPHEN 500 MG
750 TABLET ORAL EVERY 6 HOURS
Status: DISCONTINUED | OUTPATIENT
Start: 2018-07-11 | End: 2018-07-12 | Stop reason: HOSPADM

## 2018-07-11 RX ORDER — CELECOXIB 200 MG/1
200 CAPSULE ORAL 2 TIMES DAILY WITH MEALS
Status: DISCONTINUED | OUTPATIENT
Start: 2018-07-13 | End: 2018-07-12 | Stop reason: HOSPADM

## 2018-07-11 RX ADMIN — CELECOXIB 200 MG: 200 CAPSULE ORAL at 14:30

## 2018-07-11 RX ADMIN — ACETAMINOPHEN 750 MG: 500 TABLET, FILM COATED ORAL at 23:05

## 2018-07-11 RX ADMIN — Medication 1 G: at 15:12

## 2018-07-11 RX ADMIN — POTASSIUM CHLORIDE, DEXTROSE MONOHYDRATE AND SODIUM CHLORIDE: 150; 5; 450 INJECTION, SOLUTION INTRAVENOUS at 19:25

## 2018-07-11 RX ADMIN — ACETAMINOPHEN 1000 MG: 500 TABLET, COATED ORAL at 14:30

## 2018-07-11 RX ADMIN — TIZANIDINE 4 MG: 4 TABLET ORAL at 19:25

## 2018-07-11 RX ADMIN — SODIUM CHLORIDE 2 G: 9 INJECTION, SOLUTION INTRAVENOUS at 23:05

## 2018-07-11 RX ADMIN — GABAPENTIN 400 MG: 400 CAPSULE ORAL at 19:26

## 2018-07-11 RX ADMIN — GABAPENTIN 300 MG: 300 CAPSULE ORAL at 14:30

## 2018-07-11 RX ADMIN — ACETAMINOPHEN 750 MG: 500 TABLET, FILM COATED ORAL at 19:26

## 2018-07-11 RX ADMIN — DULOXETINE HYDROCHLORIDE 60 MG: 30 CAPSULE, DELAYED RELEASE ORAL at 19:25

## 2018-07-11 RX ADMIN — SODIUM CHLORIDE, POTASSIUM CHLORIDE, SODIUM LACTATE AND CALCIUM CHLORIDE 1000 ML: 600; 310; 30; 20 INJECTION, SOLUTION INTRAVENOUS at 15:00

## 2018-07-11 RX ADMIN — HYDROCODONE BITARTRATE AND ACETAMINOPHEN 15 ML: 7.5; 325 SOLUTION ORAL at 17:45

## 2018-07-11 ASSESSMENT — PAIN SCALES - GENERAL
PAINLEVEL_OUTOF10: 1
PAINLEVEL_OUTOF10: 2
PAINLEVEL_OUTOF10: 3
PAINLEVEL_OUTOF10: 0
PAINLEVEL_OUTOF10: 1
PAINLEVEL_OUTOF10: 2
PAINLEVEL_OUTOF10: 0
PAINLEVEL_OUTOF10: 1

## 2018-07-11 ASSESSMENT — PATIENT HEALTH QUESTIONNAIRE - PHQ9
1. LITTLE INTEREST OR PLEASURE IN DOING THINGS: NOT AT ALL
2. FEELING DOWN, DEPRESSED, IRRITABLE, OR HOPELESS: NOT AT ALL
SUM OF ALL RESPONSES TO PHQ9 QUESTIONS 1 AND 2: 0

## 2018-07-11 NOTE — OP REPORT
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    Indications for procedure:  This patient is a 48 y.o. female with a long standing history of right hip arthritis. The patient had failed conservative therapy including anti-inflammatory medications, activity modifications, injections, physical therapy and assistive devices. She was indicated for a right total hip replacement. The patient expressed an understanding of the risks of pain, bleeding, infection, dislocation,  need for further surgery, damage to surrounding structures, neurovascular compromise, blood clots, leg-length discrepancy both apparent and actual, anesthetic complications, and serious medical consequences including but not limited to heart attack, stroke or even death. It was explained that the implants are man-made products and thus subject to possible failure.  The patient expressed an understanding and wished to proceed. Specific risks based on the patient's medical history were addressed. A clearance was obtained by the medical physicians and the patient was taken to the operating room on the day of surgery for the above named procedure.     Description of procedure:  The patient was met in the pre-operative holding area. The right hip was marked as the appropriate surgical site with indelible ink. They were taken to the operating room where the anesthesia department started a  [facility] for intraoperative and post-operative anesthesia and pain control. The patient was turned with the operative hip facing up. All bony prominences were padded appropriately. The right hip was prepped and draped in a standard sterile surgical fashion. A time out procedure was called to verify the side and site of surgery, the proposed surgical procedure, and the administration of pre-operative antibiotics. After successful completion of the time out procedure, our attention was turned to the right hip.     A straight incision was made centered on the greater trochanter. This was carried down through the subcutaneous tissue with the assistance of the Bovie electrocautery and the self-retaining retractors. The fascia was identified and cleared with a Jim elevator. This was incised in line with its fibers and the Charnley self-retaining retractor was placed. The hip was internally rotated and the external rotators were taken down. The piriformis was identified and tagged for later repair. The abductor were retracted anteriorly and protected. The posterior capsule was identified, cleared, and incised in an L-capsulotomy fashion. The corner of the L was tagged for later repair. The hip was able to be dislocated. We noted evidence of severe arthritic changes including cartilage loss and osteophytic overgrowth. The femoral neck was cleared and our neck cut was made in line with our previously templated images using a femoral neck cutting guide. The femoral head was noted to be fragmented and collapsed, consistent with severe AVN. Several large fragments of cartilage were removed. The femoral head was removed, the femur was elevated out of the wound and we began our preparation of the femur, first with a box osteotome followed by a canal finder by hand then a lateralizing reamer on power. The femur was then broached to a size 1 Polar at which point we noted excellent calcar fit and rotational stability. The broach was  removed and the femoral canal was packed with a baby lap sponge. The femur was retracted anteriorly and our attention was turned to the acetabulum.    The acetabulum was exposed and soft tissue was removed from its rim. The straight reamer was used to obtain proper medialization and then the offset reamer was used to ream up to a size 52 at which point we noted an appropriate bony bed for implantation. The acetabulum was irrigated.  A size 52 mm acetabular component was then opened in a sterile fashion and impacted into place in the proper amount of abduction and anteversion. 3 acetabular bone screws were placed for adjunctive fixation. The lipped XLPE bearing surface was inserted and impacted into place. This was tested for proper seating and the retractors were removed and our attention was turned back to the femur.    The femur was elevated and exposed properly. The baby lap sponge was removed and the femoral canal was thoroughly irrigated. A trial stem was assembled. We trialed both the +0 and the +4 head, and with the +4 head in place we noted excellent restoration of leg length, offset and stability. The hip was stable past 90 degrees of flexion and past 60 degrees of internal rotation. It did not dislocate with extension and external rotation. Abductor tension was appropriate. At this point the hip was dislocated, the trial components were removed and the femur was irrigated. The real stem was opened in a sterile fashion on the back table and then impacted into the femur. The stem was noted to sit at the same level as the broach. Another trial reduction was made and again we noted excellent restoration of leg length, offset and stability. At this point the real head was opened and impacted onto the taper, which had been cleaned and dried thoroughly. Another reduction was made and again we noted excellent restoration of leg length, offset and stability.    Therefore a dilute betadine solution with 3 grams of  tranexamic acid was instilled for 3 minutes before being pulse-lavaged out. The posterior capsule was closed using the previously placed tag stitches. The piriformis was closed as a separate structure. The fascia was closed with #1 Vicryl in an interrupted figure of 8 fashion and then oversewn with a running #1 PDS. The deep subcutaneous tissue was closed with a running #1 PDS. The deep dermal layer was closed with 2-0 Vicryl in a buried interrupted fashion. The skin was closed with running 3-0 Monocryl and a sterile dressing was applied. The patient is currently in the operating room awaiting reversal of anesthesia. Appropriate DVT prophylaxis and perioperative antibiotics will be ordered. All sponge and instrument counts were correct prior to final wound closure.

## 2018-07-12 VITALS
HEIGHT: 62 IN | SYSTOLIC BLOOD PRESSURE: 104 MMHG | WEIGHT: 183.86 LBS | HEART RATE: 105 BPM | DIASTOLIC BLOOD PRESSURE: 64 MMHG | RESPIRATION RATE: 18 BRPM | BODY MASS INDEX: 33.84 KG/M2 | OXYGEN SATURATION: 90 % | TEMPERATURE: 98.3 F

## 2018-07-12 PROBLEM — G89.18 POST-OP PAIN: Status: ACTIVE | Noted: 2018-07-12

## 2018-07-12 PROBLEM — M16.11 PRIMARY OSTEOARTHRITIS OF RIGHT HIP: Status: ACTIVE | Noted: 2018-07-12

## 2018-07-12 LAB
HCT VFR BLD AUTO: 25.8 % (ref 37–47)
HGB BLD-MCNC: 8.4 G/DL (ref 12–16)

## 2018-07-12 PROCEDURE — G8979 MOBILITY GOAL STATUS: HCPCS | Mod: CJ

## 2018-07-12 PROCEDURE — A9270 NON-COVERED ITEM OR SERVICE: HCPCS | Performed by: PHYSICIAN ASSISTANT

## 2018-07-12 PROCEDURE — 36415 COLL VENOUS BLD VENIPUNCTURE: CPT

## 2018-07-12 PROCEDURE — G8987 SELF CARE CURRENT STATUS: HCPCS | Mod: CJ

## 2018-07-12 PROCEDURE — 97165 OT EVAL LOW COMPLEX 30 MIN: CPT

## 2018-07-12 PROCEDURE — 97162 PT EVAL MOD COMPLEX 30 MIN: CPT

## 2018-07-12 PROCEDURE — G8980 MOBILITY D/C STATUS: HCPCS | Mod: CJ

## 2018-07-12 PROCEDURE — G8978 MOBILITY CURRENT STATUS: HCPCS | Mod: CJ

## 2018-07-12 PROCEDURE — 700111 HCHG RX REV CODE 636 W/ 250 OVERRIDE (IP): Performed by: PHYSICIAN ASSISTANT

## 2018-07-12 PROCEDURE — G8988 SELF CARE GOAL STATUS: HCPCS | Mod: CI

## 2018-07-12 PROCEDURE — 85014 HEMATOCRIT: CPT

## 2018-07-12 PROCEDURE — 85018 HEMOGLOBIN: CPT

## 2018-07-12 PROCEDURE — 700105 HCHG RX REV CODE 258: Performed by: PHYSICIAN ASSISTANT

## 2018-07-12 PROCEDURE — 700102 HCHG RX REV CODE 250 W/ 637 OVERRIDE(OP): Performed by: PHYSICIAN ASSISTANT

## 2018-07-12 PROCEDURE — 700112 HCHG RX REV CODE 229: Performed by: PHYSICIAN ASSISTANT

## 2018-07-12 RX ORDER — HYDROCODONE BITARTRATE AND ACETAMINOPHEN 7.5; 325 MG/1; MG/1
1-2 TABLET ORAL EVERY 8 HOURS PRN
Qty: 60 TAB | Refills: 0 | Status: SHIPPED | OUTPATIENT
Start: 2018-07-12 | End: 2018-08-02 | Stop reason: SDUPTHER

## 2018-07-12 RX ORDER — ACETAMINOPHEN 500 MG
750 TABLET ORAL EVERY 6 HOURS
Qty: 30 TAB | Refills: 0 | Status: SHIPPED | OUTPATIENT
Start: 2018-07-12 | End: 2018-09-27

## 2018-07-12 RX ORDER — PSEUDOEPHEDRINE HCL 30 MG
100 TABLET ORAL 2 TIMES DAILY
Qty: 60 CAP | Refills: 0 | Status: SHIPPED | OUTPATIENT
Start: 2018-07-12 | End: 2019-03-12

## 2018-07-12 RX ORDER — ASPIRIN 81 MG/1
81 TABLET ORAL 2 TIMES DAILY
Qty: 60 TAB | Refills: 0 | Status: SHIPPED | OUTPATIENT
Start: 2018-07-12 | End: 2019-03-12

## 2018-07-12 RX ADMIN — LEVOTHYROXINE SODIUM 100 MCG: 100 TABLET ORAL at 05:51

## 2018-07-12 RX ADMIN — OXYCODONE HYDROCHLORIDE 10 MG: 10 TABLET ORAL at 13:10

## 2018-07-12 RX ADMIN — DEXAMETHASONE SODIUM PHOSPHATE 8 MG: 4 INJECTION, SOLUTION INTRAMUSCULAR; INTRAVENOUS at 05:52

## 2018-07-12 RX ADMIN — SODIUM CHLORIDE 2 G: 9 INJECTION, SOLUTION INTRAVENOUS at 05:51

## 2018-07-12 RX ADMIN — OXYCODONE HYDROCHLORIDE 10 MG: 10 TABLET ORAL at 10:09

## 2018-07-12 RX ADMIN — KETOROLAC TROMETHAMINE 15 MG: 30 INJECTION, SOLUTION INTRAMUSCULAR at 12:54

## 2018-07-12 RX ADMIN — TIZANIDINE 4 MG: 4 TABLET ORAL at 05:51

## 2018-07-12 RX ADMIN — DULOXETINE HYDROCHLORIDE 60 MG: 30 CAPSULE, DELAYED RELEASE ORAL at 05:51

## 2018-07-12 RX ADMIN — OXYCODONE HYDROCHLORIDE 10 MG: 10 TABLET ORAL at 07:03

## 2018-07-12 RX ADMIN — DOCUSATE SODIUM 100 MG: 100 CAPSULE, LIQUID FILLED ORAL at 05:51

## 2018-07-12 RX ADMIN — ASPIRIN TAB DELAYED RELEASE 81 MG 81 MG: 81 TABLET DELAYED RESPONSE at 01:00

## 2018-07-12 RX ADMIN — KETOROLAC TROMETHAMINE 15 MG: 30 INJECTION, SOLUTION INTRAMUSCULAR at 05:52

## 2018-07-12 RX ADMIN — ACETAMINOPHEN 750 MG: 500 TABLET, FILM COATED ORAL at 05:51

## 2018-07-12 ASSESSMENT — COGNITIVE AND FUNCTIONAL STATUS - GENERAL
TOILETING: A LITTLE
SUGGESTED CMS G CODE MODIFIER DAILY ACTIVITY: CK
DRESSING REGULAR UPPER BODY CLOTHING: A LITTLE
DRESSING REGULAR LOWER BODY CLOTHING: A LITTLE
PERSONAL GROOMING: A LITTLE
HELP NEEDED FOR BATHING: A LITTLE
DAILY ACTIVITIY SCORE: 19

## 2018-07-12 ASSESSMENT — GAIT ASSESSMENTS
GAIT LEVEL OF ASSIST: STAND BY ASSIST
ASSISTIVE DEVICE: CRUTCHES
DISTANCE (FEET): 200
DEVIATION: STEP TO

## 2018-07-12 ASSESSMENT — PAIN SCALES - GENERAL
PAINLEVEL_OUTOF10: 5
PAINLEVEL_OUTOF10: 5
PAINLEVEL_OUTOF10: 1

## 2018-07-12 ASSESSMENT — ACTIVITIES OF DAILY LIVING (ADL): TOILETING: INDEPENDENT

## 2018-07-12 NOTE — PROGRESS NOTES
S:  s/p right SHALA  Pain controlled  No N/V  No Chest Pain/SOB  Afebrile  Exam:    NAD A& O x3    RLE: +DF/PF/EHL SILT L4/L5/S1  LLE:  +DF/PF/EHL SILT L4/L5/S1    Plan:  Continue standard plan of care  Weight bearing: as tolerated with strict posterior hip precautions; Posterior hip precautions were reinforced by myself on morning rounds, and will be reiterated by RN today prior to discharge  DVT prophylaxis: SCD/Teds + ASA  Dispo: home today

## 2018-07-12 NOTE — DISCHARGE INSTRUCTIONS
Dr. Hawley's Discharge Instructions:   The first week after your joint replacement is a time to recover from the surgery. We expect light exercise to keep you active and mobile. Dr. Hawley requires a walker or cane for most patients in the first few days following surgery to try to prevent falls or complications.     Most patients are prescribed two medications for pain control: a narcotic such as Oxycodone or Hydrocodone and a milder medication called Tramadol. These can be alternated for pain control, and the priority is to decrease use of the stronger narcotic as soon as tolerated.   Take your pain medication as appropriate to ensure that your pain is not limiting your recovery. You'll be seen in clinic in 7-10 days (this appointment has already been made, call our office if you're unsure of the time). At that time, we'll prescribe your physical therapy to help with the recovery phase.     -Keep dressing clean and dry. Leave dressing in place until follow up. If you have an incisional vac dressing, the battery may die before your first post operative appointment, but you can leave the surgical dressing in place and it will be removed at your clinic visit. Call the office if you notice drainage from the surgical dressing.     -OK to shower, keep dressing in place. Pat dressing dry, do not rub incision     -Do not soak incision in bath, hot tub or pool     -Follow up with Dr. Hawley at regularly scheduled time     -Call [facility] office at [phone] for questions     -Weight bearing status: as tolerated with walker/cane; Strict posterior hip precautions     -Take medication as prescribed for DVT prophylaxis    Discharge Instructions    Discharged to home by car with relative. Discharged via wheelchair, hospital escort: Yes.  Special equipment needed: Not Applicable    Be sure to schedule a follow-up appointment with your primary care doctor or any specialists as instructed.     Discharge Plan:        I understand that  a diet low in cholesterol, fat, and sodium is recommended for good health. Unless I have been given specific instructions below for another diet, I accept this instruction as my diet prescription.   Other diet: Regular    Special Instructions: Discharge instructions for the Orthopedic Patient    Follow up with Primary Care Physician within 2 weeks of discharge to home, regarding:  Review of medications and diagnostic testing.  Surveillance for medical complications.  Workup and treatment of osteoporosis, if appropriate.     -Is this a Joint Replacement patient? Yes   Total Joint Hip Replacement Discharge Instructions    Pain  - The goal is to slowly wean off the prescription pain medicine.  - Ice can be used for pain control.  20 minutes at a time is recommended, and never directly against your skin or incision.  - Most patients are off the pain pills by 3 weeks; others may require a low level of pain medications for many months. If your pain continues to be severe, follow up with your physician.  Infection  Deep hip joint infections that require removal of the prostheses occur in less than 0.1% of patients. Lesser infections in the skin (cellulites) are more common and much more easily treated.  - Keep the incision as clean and dry as possible.  - Always wash your hands before touching your incision.  - Skin infections tend to develop around 7-10 days after surgery, most can be treated with oral antibiotics.  - Dental Care should be delayed for 3 months after surgery, your surgeon recommends taking a dose of antibiotics 1 hour prior to any dental procedure.  After 2 years, most surgeons recommend antibiotics only before an extensive procedure.  Ask your surgeon what he recommends.  - Signs and symptoms of infection can include:  low grade fever, redness, pain, swelling and drainage from your incision.  Notify your surgeon immediately if you develop any of these symptoms.  Post op Disturbances  - Bowel habits -  constipation is extremely common and is caused by a combination of anesthesia, lack of mobility and pain medicine.  Use stool softeners or laxatives if necessary. It is important not to ignore this problem, as bowel obstructions can be a serious complication after joint replacement surgery.  - Mood/Energy Level - Many patients experience a lack of energy and endurance for up to 2-3 months after surgery.  Some may also feel down and can even become depressed.  This is likely due to the postoperative anemia, change in activity level, lack of sleep, pain medicine and just the emotional reaction to the surgery itself that is a big disruption in a person’s life.  This usually passes.  If symptoms persist, follow up with your primary physician.  - Returning to work - Your surgeon will give you more specific instructions.  Generally, if you work a sedentary job requiring little standing or walking, most patients may return within 2-6 weeks.  Manual labor jobs involving walking, lifting and standing may take 3-4 months.  Your surgeon’s office can provide a release to part-time or light duty work early on in your recovery and progress you to full duty as able.  - Driving - You can begin driving an automatic shift car in 4 to 8 weeks, provided you are no longer taking narcotic pain medication. If you have a stick-shift car and your right hip was replaced, do not begin driving until your doctor says you can.   - Avoiding falls -  throw rugs and tack down loose carpeting.  Be aware of floor hazards such as pets, small objects or uneven surfaces.   -  Airport Metal Detectors - The sensitivity of metal detectors varies and it is likely that your prosthesis will cause an alarm. Inform the  that you have an artificial joint.  Diet  - Resume your normal diet as tolerated.  - It is important to achieve a healthy nutritional status by eating a well balanced diet on a regular basis.  - Your physician may  recommend that you take iron and vitamin supplements.   - Continue to drink plenty of fluids.  Shower/Bathing  - You may shower as soon as you get home from the hospital unless otherwise instructed.  - Keep your incision out of water.  To keep the incision dry when showering, cover it with a plastic bag or plastic wrap.  - Pat incision dry if it gets wet.  Don’t rub.  - Do not submerge in a bath until staples are out and the incision is completely healed. (Approximately 6-8 weeks after surgery).  Dressing Change:  Procedure (if recommended by your physician)  - Wash hands.  - Open all dressing change materials.  - Remove old dressing and discard.  - Inspect incision for redness, increase in clear drainage, yellow/green drainage, odor and surrounding skin hot to touch.  -  ABD (large gauze) pad by one corner and lay over the incision.  Be careful not to touch the inside of the dressing that will lay over the incision.  - Secure in place as instructed (Ace wrap or tape).    Swelling/Bruising  - Swelling is normal after hip replacement and can involve the thigh, knee, calf and foot.  - Swelling can last from 3-6 months.  - Elevate your leg higher than your heart while reclining.  The first week you are home you should elevate your leg an equal amount of time, as you are active.    - Anti-inflammatory pills can be taken once you have stopped the blood thinners.  - The swelling is usually worse after you go home since you are upright for longer periods of time.  - Bruising is common and can involve the entire leg including the thigh, calf and even foot.  Bruising often does not appear until after you arrive home and it can be quite dramatic- purple, black, green.  The bruising you can see is not usually concerning and will subside without any treatment.      Blood Clot Prevention  Blood clots in the legs and the less common, but frightening, clots that travel to the lungs are a real focus of our preventative. Most  patients are at standard risk for them, but those patients who are at higher risk include people who have had previous clots, a family history of clotting, smoking, diabetes, obesity, advanced age, use of estrogen and a sedentary lifestyle.    - Signs of blood clots in legs - Swelling in thigh, calf or ankle that does not go down with elevation.  Pain, heat and tenderness in calf, back of calf or groin area.  NOTE: blood clots can occur in either leg.  - You have been receiving anticoagulant therapy (blood thinners) in the hospital and you may be instructed to continue at home depending on your risk factors.  - Your risk for developing a clot continues for up to 2-3 months after surgery.  You should avoid prolonged sitting and dehydration during that time (long air trips and car trips).  If you do take a trip during this time, please get up and move around every 1- 1.5 hours.  - If you are prescribed blood thinning medication for home, follow instructions as directed. (Handouts provided if applicable).      Activity    Once you get home, you should stay active. The key is not to overdo it! While you can expect some good days and some bad days, you should notice a gradual improvement over time you should notice a gradual improvement and a gradual increase in your endurance over the next 6 to 12 months.    - Weight Bearing - If you have undergone cemented or hybrid hip replacement, you can put some weight on the leg immediately using a cane or walker, and you should continue to use some support for 4 to 6 weeks to help the muscles recover.   - Sleeping Positions - Sleep on your back with your legs slightly apart or on your side with a regular pillow between your knees. Be sure to use the pillow for at least 6 weeks, or until your doctor says you can do without it. Sleeping on your stomach should be all right  - Sitting - For at least the first 3 months, sit only in chairs that have arms. Do not sit on low chairs, low  stools, or reclining chairs. Do not cross your legs at the knees. The physical therapist will show you how to sit and stand from a chair, keeping your affected leg out in front of you. Get up and move around on a regular basis--at least once every hour.  - Walking - Walk as much as you like once your doctor gives you the go-ahead, but remember that walking is no substitute for your prescribed exercises. Walking with a pair of trekking poles is helpful and adds as much as 40% to the exercise you get when you walk  - Therapy may be needed in some cases, to strengthen your muscles and improve your gait (walking pattern).  This decision will be made at your post-operative appointment.  Follow your therapist recommended post-operative exercises (handout provided by Therapist).  - Swimming is also recommended; you can begin as soon as the sutures have been removed and the wound is healed, approximately 6 to 8 weeks after surgery. Using a pair of training fins may make swimming a more enjoyable and effective exercise.  - Other activities - Lower impact activities are preferred.  If you have specific questions, consult your Surgeon.    - Sexual activity - Your surgeon can tell you when it should be safe to resume sexual activity.      When to Call the Doctor   Call the physician if:   - Fever over 100.5? F  - Increased pain, drainage, redness, odor or heat around the incision area  - Shaking chills  - Increased knee pain with activity and rest  - Increased pain in calf, tenderness or redness above or below the knee  - Increased swelling of calf, ankle, foot  - Sudden increased shortness of breath, sudden onset of chest pain, localized chest pain with coughing  - Incision opening  Or, if there are any questions or concerns about medications or care.       -Is this patient being discharged with medication to prevent blood clots?  Yes, Aspirin Aspirin, ASA oral tablets  What is this medicine?  ASPIRIN (AS pir in) is a pain  reliever. It is used to treat mild pain and fever. This medicine is also used as directed by a doctor to prevent and to treat heart attacks, to prevent strokes, and to treat arthritis or inflammation.  This medicine may be used for other purposes; ask your health care provider or pharmacist if you have questions.  COMMON BRAND NAME(S): Aspir-Low, Aspir-Kimber, Aspirtab, Gucci Advanced Aspirin, Gucci Aspirin, Gucci Aspirin Extra Strength, Gucci Aspirin Plus, Gucci Extra Strength, Gucci Extra Strength Plus, Gucci Genuine Aspirin, Gucci Womens Aspirin, Bufferin, Bufferin Extra Strength, Bufferin Low Dose  What should I tell my health care provider before I take this medicine?  They need to know if you have any of these conditions:  -anemia  -asthma  -bleeding problems  -child with chickenpox, the flu, or other viral infection  -diabetes  -gout  -if you frequently drink alcohol containing drinks  -kidney disease  -liver disease  -low level of vitamin K  -lupus  -smoke tobacco  -stomach ulcers or other problems  -an unusual or allergic reaction to aspirin, tartrazine dye, other medicines, dyes, or preservatives  -pregnant or trying to get pregnant  -breast-feeding  How should I use this medicine?  Take this medicine by mouth with a glass of water. Follow the directions on the package or prescription label. You can take this medicine with or without food. If it upsets your stomach, take it with food. Do not take your medicine more often than directed.  Talk to your pediatrician regarding the use of this medicine in children. While this drug may be prescribed for children as young as 12 years of age for selected conditions, precautions do apply. Children and teenagers should not use this medicine to treat chicken pox or flu symptoms unless directed by a doctor.  Patients over 65 years old may have a stronger reaction and need a smaller dose.  Overdosage: If you think you have taken too much of this medicine contact a poison  control center or emergency room at once.  NOTE: This medicine is only for you. Do not share this medicine with others.  What if I miss a dose?  If you are taking this medicine on a regular schedule and miss a dose, take it as soon as you can. If it is almost time for your next dose, take only that dose. Do not take double or extra doses.  What may interact with this medicine?  Do not take this medicine with any of the following medications:  -cidofovir  -ketorolac  -probenecid  This medicine may also interact with the following medications:  -alcohol  -alendronate  -bismuth subsalicylate  -flavocoxid  -herbal supplements like feverfew, garlic, taylor, ginkgo biloba, horse chestnut  -medicines for diabetes or glaucoma like acetazolamide, methazolamide  -medicines for gout  -medicines that treat or prevent blood clots like enoxaparin, heparin, ticlopidine, warfarin  -other aspirin and aspirin-like medicines  -NSAIDs, medicines for pain and inflammation, like ibuprofen or naproxen  -pemetrexed  -sulfinpyrazone  -varicella live vaccine  This list may not describe all possible interactions. Give your health care provider a list of all the medicines, herbs, non-prescription drugs, or dietary supplements you use. Also tell them if you smoke, drink alcohol, or use illegal drugs. Some items may interact with your medicine.  What should I watch for while using this medicine?  If you are treating yourself for pain, tell your doctor or health care professional if the pain lasts more than 10 days, if it gets worse, or if there is a new or different kind of pain. Tell your doctor if you see redness or swelling. Also, check with your doctor if you have a fever that lasts for more than 3 days. Only take this medicine to prevent heart attacks or blood clotting if prescribed by your doctor or health care professional.  Do not take aspirin or aspirin-like medicines with this medicine. Too much aspirin can be dangerous. Always read  the labels carefully.  This medicine can irritate your stomach or cause bleeding problems. Do not smoke cigarettes or drink alcohol while taking this medicine. Do not lie down for 30 minutes after taking this medicine to prevent irritation to your throat.  If you are scheduled for any medical or dental procedure, tell your healthcare provider that you are taking this medicine. You may need to stop taking this medicine before the procedure.  This medicine may be used to treat migraines. If you take migraine medicines for 10 or more days a month, your migraines may get worse. Keep a diary of headache days and medicine use. Contact your healthcare professional if your migraine attacks occur more frequently.  What side effects may I notice from receiving this medicine?  Side effects that you should report to your doctor or health care professional as soon as possible:  -allergic reactions like skin rash, itching or hives, swelling of the face, lips, or tongue  -breathing problems  -changes in hearing, ringing in the ears  -confusion  -general ill feeling or flu-like symptoms  -pain on swallowing  -redness, blistering, peeling or loosening of the skin, including inside the mouth or nose  -signs and symptoms of bleeding such as bloody or black, tarry stools; red or dark-brown urine; spitting up blood or brown material that looks like coffee grounds; red spots on the skin; unusual bruising or bleeding from the eye, gums, or nose  -trouble passing urine or change in the amount of urine  -unusually weak or tired  -yellowing of the eyes or skin  Side effects that usually do not require medical attention (report to your doctor or health care professional if they continue or are bothersome):  -diarrhea or constipation  -headache  -nausea, vomiting  -stomach gas, heartburn  This list may not describe all possible side effects. Call your doctor for medical advice about side effects. You may report side effects to FDA at  1-800-FDA-1088.  Where should I keep my medicine?  Keep out of the reach of children.  Store at room temperature between 15 and 30 degrees C (59 and 86 degrees F). Protect from heat and moisture. Do not use this medicine if it has a strong vinegar smell. Throw away any unused medicine after the expiration date.  NOTE: This sheet is a summary. It may not cover all possible information. If you have questions about this medicine, talk to your doctor, pharmacist, or health care provider.  © 2018 [employer]/Gold Standard (2014-08-19 11:30:31)      · Is patient discharged on Warfarin / Coumadin?   No     Depression / Suicide Risk    [facility], it is important to learn how to keep safe from harming yourself.    Recognize the warning signs:  · Abrupt changes in personality, positive or negative- including increase in energy   · Giving away possessions  · Change in eating patterns- significant weight changes-  positive or negative  · Change in sleeping patterns- unable to sleep or sleeping all the time   · Unwillingness or inability to communicate  · Depression  · Unusual sadness, discouragement and loneliness  · Talk of wanting to die  · Neglect of personal appearance   · Rebelliousness- reckless behavior  · Withdrawal from people/activities they love  · Confusion- inability to concentrate     If you or a loved one observes any of these behaviors or has concerns about self-harm, here's what you can do:  · Talk about it- your feelings and reasons for harming yourself  · Remove any means that you might use to hurt yourself (examples: pills, rope, extension cords, firearm)  · Get professional help from the community (Mental Health, Substance Abuse, psychological counseling)  · Do not be alone:Call your Safe Contact- someone whom you trust who will be there for you.  · Call your local CRISIS HOTLINE 830-5569 or 003-012-2387  · Call your local Children's Mobile Crisis Response Team  [facility] (369) 799-3350 or www.[address].[employer]  · Call the toll free National Suicide Prevention Hotlines   · National Suicide Prevention Lifeline 910-532-VHLW (9405)  · National Hope Line Network 800-SUICIDE (994-5912)

## 2018-07-12 NOTE — PROGRESS NOTES
Received report from night RN.  CO pain, medicated per MAR.  Ice applied.  SCDs on, DSG CDI.  Updated w/ POC  Will continue with care.

## 2018-07-12 NOTE — PROGRESS NOTES
"Total Joint Replacement Program Rounding     Inpatient bedside rounding completed to address quality of care provided by total joint replacement program IDT and overall patient experience at [facility].    Patient Satisfaction addressed. Patient/family updated on POC during hospital stay, including anticipated discharge to home today and therapy.  Pt does have 5 steps at home and has crutches to use on the stairs as she does not have a rail.     Thorough safety education completed including use of call light and staff assist for all mobility. Education on hip precautions completed as well as IS use and ankle pumps to prevent post-op complications. Pt requesting to have RN call Stew, her significant other, to notify him to pick her up after work.     No further questions/concerns at this time. Please see \"MyRounding\" for further details of rounding discussion. RN updated.   "

## 2018-07-12 NOTE — PROGRESS NOTES
2 RN skin assessment complete, skin not WDL. See wound flowsheet. Pt has surgical incision to R hip, proveena wound vac to hip, dressing CDI

## 2018-07-12 NOTE — THERAPY
"Occupational Therapy Evaluation completed.   Functional Status: Pt is a 47 y/o female admitted for elective R posterior SHALA. She is currently pleasant and cooperative, with odd affect. She needed frequent reminders during session to maintain posterior hip precautions. She is requiring SBA-CGA for functional mobility and functional transfers with use of FWW. Pt reports she normally wears dresses and no underwear, however sometimes wears socks. Discussed donning pants with AE should she need it. Pt demo'd sock aid with supv. Discussed appropriate DME to increase independence and safety during ADLs. She is limited by impaired safety awareness and pain which impacts independence in aDLs and functional mobility. Pt also mentioned she has a very low bed, her mattress/box spring are on a single layer of bricks, which may be difficult to maintain precautions.  Plan of Care: Will benefit from Occupational Therapy 4 times per week  Discharge Recommendations:  Equipment: Front-Wheel Walker, Tub Transfer Bench, Raised Toilet Seat and Hip Kit. Discharge to home with outpatient or home health for additional skilled therapy services.    See \"Rehab Therapy-Acute\" Patient Summary Report for complete documentation.    "

## 2018-07-12 NOTE — PROGRESS NOTES
Received report from PACU RN, assumed pt care. Pt transferred from PACU to [facility] room 221-1. Pt A&Ox4, reports pain in R hip. Dressing to hip CDI, +CMS, cap refill less than 3 seconds, pt able to move leg w/o difficulty. Polar ice to R hip. Pt d/t void post-op by 0033. Pt taught to inform nurse if experiencing pain above comfort goal, SOB, chest pain, ambulating out of bed, or for any further assistance. Fall precautions in place, call light within reach. Will continue with care.

## 2018-07-12 NOTE — DISCHARGE SUMMARY
Taylor Salamanca was admitted on 7/11/2018 for UNILATERAL PRIMARY OA, PAIN RIGHT HIP  Right hip pain  Patient was diagnosed with severe degenerative arthritis in the right hip and underwent a right total hip arthroplasty by Dr. Colin Hawley on the date of admission. Please see dictated operative note for further information.    Hospital course: standard    The patient has done well, with no complications.  Patient denies chest pain, calf pain or shortness of breath.   Pain is well-controlled at present.  Patient is ambulating well with the use of an assistive device, and progressing in physical therapy.   Patient is neurologically and vascularly intact with palpable pedal pulses bilaterally.   Narcotic dosages were chosen by taking into account the the patient's previous history of opoid use and to ensure proper pain control after surgery    Discharge date: 7-12-18    Patient is being discharged to home after am physical therapy.     Allergies:  Percocet [perloxx]       Medication List      START taking these medications      Instructions   acetaminophen 500 MG Tabs  Commonly known as:  TYLENOL   Take 1.5 Tabs by mouth every 6 hours.  Dose:  750 mg     aspirin 81 MG EC tablet   Take 1 Tab by mouth 2 Times a Day.  Dose:  81 mg     docusate sodium 100 MG Caps   Take 100 mg by mouth 2 Times a Day.  Dose:  100 mg        CHANGE how you take these medications      Instructions   HYDROcodone-acetaminophen 7.5-325 MG per tablet  What changed:  · when to take this  · reasons to take this  Commonly known as:  NORCO   Take 1-2 Tabs by mouth every 8 hours as needed for Severe Pain (for pain; can be alternated with Tramadol for pain control;) for up to 14 days.  Dose:  1-2 Tab        CONTINUE taking these medications      Instructions   albuterol 108 (90 Base) MCG/ACT Aers inhalation aerosol   Inhale 2 Puffs by mouth every four hours as needed for Shortness of Breath.  Dose:  2 Puff     DULoxetine 60 MG Cpep delayed-release  capsule  Commonly known as:  CYMBALTA   Take 60 mg by mouth 2 times a day.  Dose:  60 mg     fluticasone 50 MCG/ACT nasal spray  Commonly known as:  FLONASE   Spray 2 Sprays in nose every day.  Dose:  2 Spray     gabapentin 100 MG Caps  Commonly known as:  NEURONTIN   Take 100-200 mg by mouth every day. 100-200 mg every morning 400 mg at bedtime  Dose:  100-200 mg     levothyroxine 100 MCG Tabs  Commonly known as:  SYNTHROID   Take 100 mcg by mouth Every morning on an empty stomach.  Dose:  100 mcg     naproxen 500 MG Tabs  Commonly known as:  NAPROSYN   Take 1 Tab by mouth 2 times a day, with meals.  Dose:  500 mg     tizanidine 4 MG Tabs  Commonly known as:  ZANAFLEX   Take 4 mg by mouth. Takes one tab in the am and 2 at bedtime  Dose:  4 mg     ZOLPIDEM TARTRATE PO   Take 10 mg by mouth. Takes 0.5 to 1 tab prn at bedtime for sleep  Dose:  10 mg        STOP taking these medications    hydrOXYzine HCl 25 MG Tabs  Commonly known as:  ATARAX            Discharge Instructions:     Patient is instructed to ambulate and weight bear as tolerated with the use of an assistive device, and to continue physical therapy exercises given during this hospital stay. Strict posterior hip precautions are to be observed for patients who underwent a total hip replacement.   Patient is to ice and elevate the surgical leg regularly, with pillows under the ankle, nothing is to be placed under the knee.   Patient was given detailed wound care instructions, and will leave the silver dressing on until first post-op visit.   ASA twice daily for DVT prophylaxis.  Patient is to follow up with Dr. Hawley's office in 1-2 weeks.

## 2018-07-12 NOTE — OR NURSING
1700- Patient admitted to floor from ED. No distress noted at this time. Patient currently having oxygen infusing via nasal cannula at this time. Breath sounds clear with respirations even and unlabored.     1715- Patient awake, assisted with sitting up in bed. Patient denies any pain at this time. Patient given water and able to tolerate.     1730- Patient resting in bed. No distress noted. Patient family called and updated on patient status.    1745- Hydrocodone/Acetaminophen 7.5-325 given as charted in medication administration record for complaints of mild right hip pain.    1800- Criteria met for pain discharge from PACU stage I to the floor.     1815- Report called to floor.     1820- patient discharged to floor.

## 2018-07-12 NOTE — FLOWSHEET NOTE
07/11/18 1950   Events/Summary/Plan   Events/Summary/Plan IS   Incentive Spirometry Group   Incentive Spirometry Instruction Yes   Breathing Exercises Yes   Incentive Spirometer Volume 2000 mL   Incentive Spirometer Date Last Changed 07/11/18   Incentive Spirometer Next Change Date (Q 30 Days) 08/10/18   Chest Exam   Respiration 18   Pulse (!) 101   Oximetry   Continuous Oximetry Yes   Oxygen   Pulse Oximetry 99 %   O2 (LPM) 2   FiO2% (!) 0 %   O2 Daily Delivery Respiratory  Nasal Cannula

## 2018-07-12 NOTE — DISCHARGE PLANNING
Sw met with this patient to discuss DME order and choice.  Patient declined a FWW stating that she already has one at home.  Patient shared that she would like to a raised toilet seat.  Care chest information discussed and application/information provided. Floor RN updated.

## 2018-07-12 NOTE — FACE TO FACE
Face to Face Note  -  Durable Medical Equipment    Colin Hawley M.D. - NPI: [id number]  I certify that this patient is under my care and that they had a durable medical equipment(DME)face to face encounter by myself that meets the physician DME face-to-face encounter requirements with this patient on:    Date of encounter:   Patient:                    MRN:                       YOB: 2020  Taylor Salamanca  [id number]  1970     The encounter with the patient was in whole, or in part, for the following medical condition, which is the primary reason for durable medical equipment:  Total Joint Replacement    I certify that, based on my findings, the following durable medical equipment is medically necessary:  Walkers.    HOME O2 Saturation Measurements:(Values must be present for Home Oxygen orders)         ,     ,         My Clinical findings support the need for the above equipment due to:  Abnormal Gait    Supporting Symptoms: s/p right SHALA, needs walker for ambulation support

## 2018-08-02 ENCOUNTER — OFFICE VISIT (OUTPATIENT)
Dept: MEDICAL GROUP | Facility: PHYSICIAN GROUP | Age: 48
End: 2018-08-02
Payer: MEDICARE

## 2018-08-02 VITALS
HEIGHT: 63 IN | SYSTOLIC BLOOD PRESSURE: 120 MMHG | DIASTOLIC BLOOD PRESSURE: 84 MMHG | BODY MASS INDEX: 31.01 KG/M2 | OXYGEN SATURATION: 92 % | TEMPERATURE: 99.6 F | WEIGHT: 175 LBS | HEART RATE: 94 BPM

## 2018-08-02 DIAGNOSIS — G47.33 OSA (OBSTRUCTIVE SLEEP APNEA): ICD-10-CM

## 2018-08-02 DIAGNOSIS — Z12.11 SCREEN FOR COLON CANCER: ICD-10-CM

## 2018-08-02 DIAGNOSIS — Z79.891 CHRONIC USE OF OPIATE DRUGS THERAPEUTIC PURPOSES: ICD-10-CM

## 2018-08-02 DIAGNOSIS — G47.411 NARCOLEPSY AND CATAPLEXY: ICD-10-CM

## 2018-08-02 DIAGNOSIS — Z96.641 STATUS POST TOTAL REPLACEMENT OF RIGHT HIP: ICD-10-CM

## 2018-08-02 DIAGNOSIS — Z09 HOSPITAL DISCHARGE FOLLOW-UP: ICD-10-CM

## 2018-08-02 PROBLEM — M16.11 PRIMARY OSTEOARTHRITIS OF RIGHT HIP: Status: RESOLVED | Noted: 2018-07-12 | Resolved: 2018-08-02

## 2018-08-02 PROBLEM — G89.18 POST-OP PAIN: Status: RESOLVED | Noted: 2018-07-12 | Resolved: 2018-08-02

## 2018-08-02 PROCEDURE — 99214 OFFICE O/P EST MOD 30 MIN: CPT | Performed by: NURSE PRACTITIONER

## 2018-08-02 RX ORDER — ARMODAFINIL 150 MG/1
150 TABLET ORAL DAILY
Qty: 90 TAB | Refills: 0 | Status: SHIPPED
Start: 2018-08-02 | End: 2018-09-27

## 2018-08-02 RX ORDER — HYDROCODONE BITARTRATE AND ACETAMINOPHEN 7.5; 325 MG/1; MG/1
1 TABLET ORAL EVERY 12 HOURS PRN
Qty: 60 TAB | Refills: 0 | Status: SHIPPED | OUTPATIENT
Start: 2018-08-02 | End: 2018-09-01

## 2018-08-02 NOTE — PROGRESS NOTES
Subjective:     Chief Complaint   Patient presents with   • Hospital Follow-up     hip surgery Fv       HPI  Taylor Saalmanca is a 48 y.o. female here today for post-op f/u. Was admitted 7/11-12/2018 for severe right hip OA and had a right total hip arthroplasty by Dr. Colin Hawley on the date of admission. Did well with no complications. Discharged home on 14 days supply of Norco 60 tabs so 1 tab Q6h. She has not had any for the past 2 days. She was having constipation from narcotics, but this is improving and nearly resolved. Today is the first day she has been up and moving well. She had reaction anesthesia for many days with nausea, hot flashes, and not feeling well. She starts PT next week twice weekly for 3 weeks. She did just have f/u with Dr. Hawley who told her he is happy with her progression. X-ray was stable.     Chronic use of opiate drugs therapeutic purposes  Last dose of narcotic medication: a few days ago   Analgesia: 2/10  Activity level: good for being post-op  Adverse events: none  Aberrant behavior: none  Affect and mood: calm and pleasant   Nonnarcotic treatments that are being used: tizanidine and naproxen with gabapentin and cymbalta  Pain management agreement initiated and signed on: 4/23/18  Most recent urine drug screen done 6/9/18, and is consistent with prescribed medications  Opiate risk score: 2  Total daily opiate dosage: morphine 15 mEq now          Narcolepsy and Cataplexy  Patient has diagnosed narcolepsy with cataplexy and sleep apnea with last sleep study 2016. Previously saw Dr. Heaton, but then there was no further follow-up on this. She would like to restart her Nuvigil and address her sleep apnea.        Diagnoses of Hospital discharge follow-up, Status post total replacement of right hip, Chronic use of opiate drugs therapeutic purposes, Narcolepsy and cataplexy, TROY (obstructive sleep apnea), and Screen for colon cancer were pertinent to this visit.    Allergies:  Percocet [perloxx]  Current medicines (including changes today)  Current Outpatient Prescriptions   Medication Sig Dispense Refill   • HYDROcodone-acetaminophen (NORCO) 7.5-325 MG per tablet Take 1 Tab by mouth every 12 hours as needed for Severe Pain for up to 30 days. 60 Tab 0   • Armodafinil 150 MG Tab Take 150 mg by mouth every day for 90 days. 90 Tab 0   • acetaminophen (TYLENOL) 500 MG Tab Take 1.5 Tabs by mouth every 6 hours. 30 Tab 0   • aspirin EC 81 MG EC tablet Take 1 Tab by mouth 2 Times a Day. 60 Tab 0   • docusate sodium 100 MG Cap Take 100 mg by mouth 2 Times a Day. 60 Cap 0   • levothyroxine (SYNTHROID) 100 MCG Tab Take 100 mcg by mouth Every morning on an empty stomach.     • ZOLPIDEM TARTRATE PO Take 10 mg by mouth. Takes 0.5 to 1 tab prn at bedtime for sleep     • naproxen (NAPROSYN) 500 MG Tab Take 1 Tab by mouth 2 times a day, with meals. 180 Tab 0   • fluticasone (FLONASE) 50 MCG/ACT nasal spray Spray 2 Sprays in nose every day. 3 Bottle 3   • tizanidine (ZANAFLEX) 4 MG Tab Take 4 mg by mouth. Takes one tab in the am and 2 at bedtime     • DULoxetine (CYMBALTA) 60 MG Cap DR Particles delayed-release capsule Take 60 mg by mouth 2 times a day.     • gabapentin (NEURONTIN) 100 MG Cap Take 100-200 mg by mouth every day. 100-200 mg every morning  400 mg at bedtime     • albuterol 108 (90 Base) MCG/ACT Aero Soln inhalation aerosol Inhale 2 Puffs by mouth every four hours as needed for Shortness of Breath. 1 Inhaler 1     No current facility-administered medications for this visit.        She  has a past medical history of Allergy; Anesthesia; Aphthous stomatitis; Arthritis; Bipolar 2 disorder (HCC); Cancer (HCC); Depression; Hyperlipidemia; Infectious disease; LBP (low back pain); MEDICAL HOME; Narcolepsy and cataplexy; Neck pain; Pain (06/27/2018); and Thyroid cancer (HCC) (1/2012). She also has no past medical history of Breast cancer (HCC) or Diabetes.        ROS  As stated in HPI and  "additionally  Gen: +excessive fatigue. No F/C  Neuro: No HA or dizziness  CV: No chest pain or LE edema  Pulm: No sob or dyspnea     Objective:     Blood pressure 120/84, pulse 94, temperature 37.6 °C (99.6 °F), height 1.588 m (5' 2.5\"), weight 79.4 kg (175 lb), last menstrual period 03/14/2012, SpO2 92 %. Body mass index is 31.5 kg/m².  Physical Exam:  General: Alert, oriented, in no acute distress.  Eye contact is good, speech goal directed, affect calm  CNs grossly intact.  HEENT: conjunctiva non-injected, sclera non-icteric, EOMs intact. No lid edema or eye drainage.   Gross hearing intact.  Neck: Supple. No adenopathy or masses in the cervical or supraclavicular regions  Lungs: unlabored. clear to auscultation bilaterally with good excursion.  CV: regular rate and rhythm. No murmurs  Ext: no edema, normal color and temperature.   Skin: No rashes or lesions in visible areas  Gait steady.     Assessment and Plan:   Assessment/Plan:  1. Hospital discharge follow-up  Stable. I have reviewed all hospital records including lab results, imaging studies, progress notes, and admission and discharge summaries. Educated patient on s/sx to observe for and what action to take when these occur. Reviewed current medications and educated on importance of compliance with these medications all appropriate follow-up visits are scheduled.     2. Status post total replacement of right hip  Improving and stable. Continue with PT and f/u with ortho. Will taper down from Thayer as the severe hip pain was why we originally started chronic pain management this year   - HYDROcodone-acetaminophen (NORCO) 7.5-325 MG per tablet; Take 1 Tab by mouth every 12 hours as needed for Severe Pain for up to 30 days.  Dispense: 60 Tab; Refill: 0    3. Chronic use of opiate drugs therapeutic purposes  Obtained and reviewed the patient utilization report state pharmacy database on 8/3/2018.  Based on assessment of report prescription for Thayer is " medically necessary. Patient has not requested any early refills and exhibits no abberant behavior. I have advised patient to keep medication and safe place and to not drive, use alcohol, or take illicit drugs while taking this medication.    4. Narcolepsy and cataplexy  Not controlled. Restart Nuvigil. F/u with pulm.   - Armodafinil 150 MG Tab; Take 150 mg by mouth every day for 90 days.  Dispense: 90 Tab; Refill: 0    5. TROY (obstructive sleep apnea)  We will get patient scheduled for follow-up with pulmonology sleep Center    6. Screen for colon cancer  - OCCULT BLOOD FECES IMMUNOASSAY; Future       Follow up:  Return in about 3 months (around 11/2/2018).    Educated in proper administration of medication(s) ordered today including safety, possible SE, risks, benefits, rationale and alternatives to therapy.   Supportive care, differential diagnoses, and indications for immediate follow-up discussed with patient.    Pathogenesis of diagnosis discussed including typical length and natural progression.    Instructed to return to clinic or nearest emergency department for any change in condition, further concerns, or worsening of symptoms.  Patient states understanding of the plan of care and discharge instructions.      Please note that this dictation was created using voice recognition software. I have made every reasonable attempt to correct obvious errors, but I expect that there are errors of grammar and possibly content that I did not discover before finalizing the note.    Followup: Return in about 3 months (around 11/2/2018). sooner should new symptoms or problems arise.

## 2018-08-03 ENCOUNTER — TELEPHONE (OUTPATIENT)
Dept: MEDICAL GROUP | Facility: PHYSICIAN GROUP | Age: 48
End: 2018-08-03

## 2018-08-03 PROBLEM — G89.29 CHRONIC RIGHT HIP PAIN: Status: RESOLVED | Noted: 2018-03-30 | Resolved: 2018-08-03

## 2018-08-03 PROBLEM — M25.551 CHRONIC RIGHT HIP PAIN: Status: RESOLVED | Noted: 2018-03-30 | Resolved: 2018-08-03

## 2018-08-03 NOTE — ASSESSMENT & PLAN NOTE
Patient has diagnosed narcolepsy with cataplexy and sleep apnea with last sleep study 2016. Previously saw Dr. Heaton, but then there was no further follow-up on this. She would like to restart her Nuvigil and address her sleep apnea.

## 2018-08-03 NOTE — ASSESSMENT & PLAN NOTE
Last dose of narcotic medication: a few days ago   Analgesia: 2/10  Activity level: good for being post-op  Adverse events: none  Aberrant behavior: none  Affect and mood: calm and pleasant   Nonnarcotic treatments that are being used: tizanidine and naproxen with gabapentin and cymbalta  Pain management agreement initiated and signed on: 4/23/18  Most recent urine drug screen done 6/9/18, and is consistent with prescribed medications  Opiate risk score: 2  Total daily opiate dosage: morphine 15 mEq now

## 2018-08-03 NOTE — TELEPHONE ENCOUNTER
MEDICATION PRIOR AUTHORIZATION NEEDED:    1. Name of Medication: Armodafinil 150 MG Tab    2. Requested By (Name of Pharmacy): [facility]     3. Is insurance on file current? Yes    4. What is the name & phone number of the 3rd party payor? Covermymeds:  KEY:  [id number]  LN:     SOPHIA  : [id number]

## 2018-08-06 ENCOUNTER — TELEPHONE (OUTPATIENT)
Dept: MEDICAL GROUP | Facility: PHYSICIAN GROUP | Age: 48
End: 2018-08-06

## 2018-08-07 ENCOUNTER — APPOINTMENT (OUTPATIENT)
Dept: MEDICAL GROUP | Facility: PHYSICIAN GROUP | Age: 48
End: 2018-08-07
Payer: MEDICARE

## 2018-08-07 NOTE — TELEPHONE ENCOUNTER
FINAL PRIOR AUTHORIZATION STATUS:    1.  Name of Medication & Dose: Armodafinil 150 MG Tab     2. Prior Auth Status: Approved through 12/31/18     3. Action Taken: Pharmacy Notified: N\A Patient Notified: N\A

## 2018-09-27 ENCOUNTER — OFFICE VISIT (OUTPATIENT)
Dept: MEDICAL GROUP | Facility: PHYSICIAN GROUP | Age: 48
End: 2018-09-27
Payer: MEDICARE

## 2018-09-27 ENCOUNTER — HOSPITAL ENCOUNTER (OUTPATIENT)
Dept: LAB | Facility: MEDICAL CENTER | Age: 48
End: 2018-09-27
Attending: NURSE PRACTITIONER
Payer: MEDICARE

## 2018-09-27 VITALS
BODY MASS INDEX: 31.01 KG/M2 | DIASTOLIC BLOOD PRESSURE: 76 MMHG | HEART RATE: 101 BPM | SYSTOLIC BLOOD PRESSURE: 124 MMHG | WEIGHT: 175 LBS | TEMPERATURE: 97.9 F | OXYGEN SATURATION: 99 % | HEIGHT: 63 IN

## 2018-09-27 DIAGNOSIS — Z12.31 VISIT FOR SCREENING MAMMOGRAM: ICD-10-CM

## 2018-09-27 DIAGNOSIS — E78.2 MIXED HYPERLIPIDEMIA: ICD-10-CM

## 2018-09-27 DIAGNOSIS — Z12.11 SCREEN FOR COLON CANCER: ICD-10-CM

## 2018-09-27 DIAGNOSIS — M79.641 BILATERAL HAND PAIN: ICD-10-CM

## 2018-09-27 DIAGNOSIS — M79.642 BILATERAL HAND PAIN: ICD-10-CM

## 2018-09-27 DIAGNOSIS — E89.0 POSTOPERATIVE HYPOTHYROIDISM: ICD-10-CM

## 2018-09-27 DIAGNOSIS — G47.411 NARCOLEPSY AND CATAPLEXY: ICD-10-CM

## 2018-09-27 DIAGNOSIS — G47.33 OSA (OBSTRUCTIVE SLEEP APNEA): ICD-10-CM

## 2018-09-27 DIAGNOSIS — M79.672 BILATERAL FOOT PAIN: ICD-10-CM

## 2018-09-27 DIAGNOSIS — Z79.891 CHRONIC USE OF OPIATE DRUGS THERAPEUTIC PURPOSES: ICD-10-CM

## 2018-09-27 DIAGNOSIS — D64.9 POSTOPERATIVE ANEMIA: ICD-10-CM

## 2018-09-27 DIAGNOSIS — Z79.1 NSAID LONG-TERM USE: ICD-10-CM

## 2018-09-27 DIAGNOSIS — Z00.00 PREVENTATIVE HEALTH CARE: ICD-10-CM

## 2018-09-27 DIAGNOSIS — M19.90 ARTHRITIS: ICD-10-CM

## 2018-09-27 DIAGNOSIS — M79.671 BILATERAL FOOT PAIN: ICD-10-CM

## 2018-09-27 LAB
BASOPHILS # BLD AUTO: 0.7 % (ref 0–1.8)
BASOPHILS # BLD: 0.03 K/UL (ref 0–0.12)
EOSINOPHIL # BLD AUTO: 0.07 K/UL (ref 0–0.51)
EOSINOPHIL NFR BLD: 1.7 % (ref 0–6.9)
ERYTHROCYTE [DISTWIDTH] IN BLOOD BY AUTOMATED COUNT: 45.5 FL (ref 35.9–50)
ERYTHROCYTE [SEDIMENTATION RATE] IN BLOOD BY WESTERGREN METHOD: 12 MM/HOUR (ref 0–30)
HCT VFR BLD AUTO: 37.9 % (ref 37–47)
HGB BLD-MCNC: 11.5 G/DL (ref 12–16)
IMM GRANULOCYTES # BLD AUTO: 0.01 K/UL (ref 0–0.11)
IMM GRANULOCYTES NFR BLD AUTO: 0.2 % (ref 0–0.9)
LYMPHOCYTES # BLD AUTO: 1.39 K/UL (ref 1–4.8)
LYMPHOCYTES NFR BLD: 33.8 % (ref 22–41)
MCH RBC QN AUTO: 28.8 PG (ref 27–33)
MCHC RBC AUTO-ENTMCNC: 30.3 G/DL (ref 33.6–35)
MCV RBC AUTO: 94.8 FL (ref 81.4–97.8)
MONOCYTES # BLD AUTO: 0.44 K/UL (ref 0–0.85)
MONOCYTES NFR BLD AUTO: 10.7 % (ref 0–13.4)
NEUTROPHILS # BLD AUTO: 2.17 K/UL (ref 2–7.15)
NEUTROPHILS NFR BLD: 52.9 % (ref 44–72)
NRBC # BLD AUTO: 0 K/UL
NRBC BLD-RTO: 0 /100 WBC
PLATELET # BLD AUTO: 297 K/UL (ref 164–446)
PMV BLD AUTO: 10.2 FL (ref 9–12.9)
RBC # BLD AUTO: 4 M/UL (ref 4.2–5.4)
RHEUMATOID FACT SER IA-ACNC: <10 IU/ML (ref 0–14)
T4 FREE SERPL-MCNC: 0.95 NG/DL (ref 0.53–1.43)
TSH SERPL DL<=0.005 MIU/L-ACNC: 19.57 UIU/ML (ref 0.38–5.33)
WBC # BLD AUTO: 4.1 K/UL (ref 4.8–10.8)

## 2018-09-27 PROCEDURE — 86431 RHEUMATOID FACTOR QUANT: CPT

## 2018-09-27 PROCEDURE — 86038 ANTINUCLEAR ANTIBODIES: CPT

## 2018-09-27 PROCEDURE — 84443 ASSAY THYROID STIM HORMONE: CPT

## 2018-09-27 PROCEDURE — 99214 OFFICE O/P EST MOD 30 MIN: CPT | Performed by: NURSE PRACTITIONER

## 2018-09-27 PROCEDURE — 85652 RBC SED RATE AUTOMATED: CPT

## 2018-09-27 PROCEDURE — 80061 LIPID PANEL: CPT

## 2018-09-27 PROCEDURE — 85025 COMPLETE CBC W/AUTO DIFF WBC: CPT

## 2018-09-27 PROCEDURE — 86140 C-REACTIVE PROTEIN: CPT

## 2018-09-27 PROCEDURE — 86200 CCP ANTIBODY: CPT

## 2018-09-27 PROCEDURE — 84439 ASSAY OF FREE THYROXINE: CPT

## 2018-09-27 PROCEDURE — 80053 COMPREHEN METABOLIC PANEL: CPT

## 2018-09-27 PROCEDURE — 36415 COLL VENOUS BLD VENIPUNCTURE: CPT

## 2018-09-27 RX ORDER — OMEPRAZOLE 20 MG/1
20 CAPSULE, DELAYED RELEASE ORAL DAILY
Qty: 90 CAP | Refills: 3 | Status: SHIPPED | OUTPATIENT
Start: 2018-09-27 | End: 2019-03-12 | Stop reason: SDUPTHER

## 2018-09-27 RX ORDER — TRAMADOL HYDROCHLORIDE 50 MG/1
50-100 TABLET ORAL EVERY 12 HOURS PRN
Qty: 75 TAB | Refills: 0 | Status: SHIPPED | OUTPATIENT
Start: 2018-11-26 | End: 2018-12-26

## 2018-09-27 RX ORDER — TRAMADOL HYDROCHLORIDE 50 MG/1
50-100 TABLET ORAL EVERY 12 HOURS PRN
Qty: 75 TAB | Refills: 0 | Status: SHIPPED | OUTPATIENT
Start: 2018-09-27 | End: 2018-09-27 | Stop reason: SDUPTHER

## 2018-09-27 RX ORDER — TRAMADOL HYDROCHLORIDE 50 MG/1
50-100 TABLET ORAL EVERY 12 HOURS PRN
Qty: 75 TAB | Refills: 0 | Status: SHIPPED | OUTPATIENT
Start: 2018-10-27 | End: 2018-09-27 | Stop reason: SDUPTHER

## 2018-09-27 NOTE — ASSESSMENT & PLAN NOTE
Post-operative hypothyroid continues to be borderline at goal with levothyroxine. She feels levothyroxine makes her feel sick with some heart racing and would like to try something else     Ref. Range 12/9/2014 13:54 9/25/2015 14:41 4/25/2016 18:35 11/23/2016 15:42   TSH Latest Ref Range: 0.300 - 3.700 uIU/mL 13.650 (H) 15.510 (H) 98.680 (H) 60.530 (H)   Free T-4 Latest Ref Range: 0.53 - 1.43 ng/dL 0.91 1.07

## 2018-09-27 NOTE — ASSESSMENT & PLAN NOTE
Long-standing diagnosis with narcolepsy and cataplexy with sleep apnea confirmed on sleep study 2016. She took her armodafinil for two weeks and it is not working so she has stopped taking this. She has not had f/u with pulmonology

## 2018-09-27 NOTE — ASSESSMENT & PLAN NOTE
The pain is mostly localized to hands and feet. The pain is palmar aspect of hands along tendon lines. In the fingers pain is most bothersome site is DIP joints with nodules. She broke right thumb in past and never had any treatment for this. There is minimal pain in MCP or PIP joints, but there is numbness along PIP joints. There is never associated erythema, edema, or warmth.   She has nodules in arches of feet. When she does not move around enough, they increase in size and become more painful.     She has been using mobic which does not seem to help, and additionally is taking ibuprofen as needed. She is taking on average Tramadol 200 mg total in a day, and flexaril.

## 2018-09-28 LAB
ALBUMIN SERPL BCP-MCNC: 4.3 G/DL (ref 3.2–4.9)
ALBUMIN/GLOB SERPL: 1.3 G/DL
ALP SERPL-CCNC: 95 U/L (ref 30–99)
ALT SERPL-CCNC: 10 U/L (ref 2–50)
ANION GAP SERPL CALC-SCNC: 7 MMOL/L (ref 0–11.9)
AST SERPL-CCNC: 17 U/L (ref 12–45)
BILIRUB SERPL-MCNC: 0.2 MG/DL (ref 0.1–1.5)
BUN SERPL-MCNC: 16 MG/DL (ref 8–22)
CALCIUM SERPL-MCNC: 9.1 MG/DL (ref 8.5–10.5)
CHLORIDE SERPL-SCNC: 104 MMOL/L (ref 96–112)
CHOLEST SERPL-MCNC: 228 MG/DL (ref 100–199)
CO2 SERPL-SCNC: 28 MMOL/L (ref 20–33)
CREAT SERPL-MCNC: 0.78 MG/DL (ref 0.5–1.4)
CRP SERPL HS-MCNC: 0.09 MG/DL (ref 0–0.75)
GLOBULIN SER CALC-MCNC: 3.2 G/DL (ref 1.9–3.5)
GLUCOSE SERPL-MCNC: 73 MG/DL (ref 65–99)
HDLC SERPL-MCNC: 47 MG/DL
LDLC SERPL CALC-MCNC: 147 MG/DL
POTASSIUM SERPL-SCNC: 4.4 MMOL/L (ref 3.6–5.5)
PROT SERPL-MCNC: 7.5 G/DL (ref 6–8.2)
SODIUM SERPL-SCNC: 139 MMOL/L (ref 135–145)
TRIGL SERPL-MCNC: 170 MG/DL (ref 0–149)

## 2018-09-30 LAB
CCP IGG SERPL-ACNC: 3 UNITS (ref 0–19)
NUCLEAR IGG SER QL IA: NORMAL

## 2018-10-01 ENCOUNTER — TELEPHONE (OUTPATIENT)
Dept: MEDICAL GROUP | Facility: PHYSICIAN GROUP | Age: 48
End: 2018-10-01

## 2018-10-01 RX ORDER — LEVOTHYROXINE SODIUM 112 MCG
112 TABLET ORAL
Qty: 90 TAB | Refills: 1 | Status: SHIPPED | OUTPATIENT
Start: 2018-10-01 | End: 2019-03-12

## 2018-10-02 NOTE — TELEPHONE ENCOUNTER
Phone Number Called: Pulmonary    Message: spoke with pulmonary they inform me to see DR. Kirkpatrick it will be until March 4,2019 or she can see someone else and be seen on Nov 2, 2019. Called pt to see if that was okay she did not answer so I left a message.     Left Message for patient to call back: yes

## 2018-10-02 NOTE — ASSESSMENT & PLAN NOTE
Weight remains stable now without further gain since her hip surgery. She does not exercise and finances make dietary options limited.

## 2018-10-02 NOTE — ASSESSMENT & PLAN NOTE
This is currently untreated as she has not had f/u with pulmonology since her sleep study confirmed sleep apnea

## 2018-10-02 NOTE — ASSESSMENT & PLAN NOTE
Has long-standing hx of dyslipidemia, but has also had untreated thyroid disorder so unsure of true numbers. Need f/u lipid panel now.

## 2018-10-02 NOTE — PROGRESS NOTES
Subjective:     Chief Complaint   Patient presents with   • Thyroid Problem     recheck thyroid wants to stop med   • Arthritis     hands and feet getting worst   • Sleep Problem     unable to get sleep study done       HPI  Taylor Salamanca is a 48 y.o. female here today for routine f/u     Postoperative hypothyroidism  Post-operative hypothyroid continues to be borderline at goal with levothyroxine. She feels levothyroxine makes her feel sick with some heart racing and would like to try something else     Ref. Range 12/9/2014 13:54 9/25/2015 14:41 4/25/2016 18:35 11/23/2016 15:42   TSH Latest Ref Range: 0.300 - 3.700 uIU/mL 13.650 (H) 15.510 (H) 98.680 (H) 60.530 (H)   Free T-4 Latest Ref Range: 0.53 - 1.43 ng/dL 0.91 1.07         Narcolepsy and Cataplexy  Long-standing diagnosis with narcolepsy and cataplexy with sleep apnea confirmed on sleep study 2016. She took her armodafinil for two weeks and it is not working so she has stopped taking this. She has not had f/u with pulmonology     Arthritis  The pain is mostly localized to hands and feet. The pain is palmar aspect of hands along tendon lines. In the fingers pain is most bothersome site is DIP joints with nodules. She broke right thumb in past and never had any treatment for this. There is minimal pain in MCP or PIP joints, but there is numbness along PIP joints. There is never associated erythema, edema, or warmth.   She has nodules in arches of feet. When she does not move around enough, they increase in size and become more painful.     She has been using mobic which does not seem to help, and additionally is taking ibuprofen as needed. She is taking on average Tramadol 200 mg total in a day, and flexaril.     Hyperlipidemia  Has long-standing hx of dyslipidemia, but has also had untreated thyroid disorder so unsure of true numbers. Need f/u lipid panel now.     TROY (obstructive sleep apnea)  This is currently untreated as she has not had f/u with  pulmonology since her sleep study confirmed sleep apnea    BMI 31.0-31.9,adult  Weight remains stable now without further gain since her hip surgery. She does not exercise and finances make dietary options limited.     Chronic use of opiate drugs therapeutic purposes  Last dose of narcotic medication: yesterday   Analgesia: 2/10  Activity level: good  Adverse events: none  Aberrant behavior: none  Affect and mood: calm and pleasant   Nonnarcotic treatments that are being used: tizanidine and naproxen with gabapentin and cymbalta  Pain management agreement initiated and signed on: 4/23/18  Most recent urine drug screen done 6/9/18, and is consistent with prescribed medications  Opiate risk score: 2  Total daily opiate dosage: morphine 15 mEq now           Diagnoses of Bilateral hand pain, Bilateral foot pain, Arthritis, TROY (obstructive sleep apnea), Narcolepsy and cataplexy, Postoperative hypothyroidism, Postoperative anemia, Mixed hyperlipidemia, BMI 31.0-31.9,adult, NSAID long-term use, Visit for screening mammogram, Screen for colon cancer, Preventative health care, and Chronic use of opiate drugs therapeutic purposes were pertinent to this visit.    Allergies: Percocet [perloxx]  Current medicines (including changes today)  Current Outpatient Prescriptions   Medication Sig Dispense Refill   • [START ON 11/26/2018] tramadol (ULTRAM) 50 MG Tab Take 1-2 Tabs by mouth every 12 hours as needed for Severe Pain (max 75 tabs/month) for up to 30 days. 75 Tab 0   • omeprazole (PRILOSEC) 20 MG delayed-release capsule Take 1 Cap by mouth every day. 30 minutes before breakfast 90 Cap 3   • naproxen (NAPROSYN) 500 MG Tab Take 1 Tab by mouth 2 times a day, with meals. 180 Tab 0   • tizanidine (ZANAFLEX) 4 MG Tab Take 4 mg by mouth. Takes one tab in the am and 2 at bedtime     • DULoxetine (CYMBALTA) 60 MG Cap DR Particles delayed-release capsule Take 60 mg by mouth 2 times a day.     • gabapentin (NEURONTIN) 100 MG Cap Take  "100-200 mg by mouth every day. 100-200 mg every morning  400 mg at bedtime     • SYNTHROID 112 MCG Tab Take 1 Tab by mouth Every morning on an empty stomach. 90 Tab 1   • aspirin EC 81 MG EC tablet Take 1 Tab by mouth 2 Times a Day. 60 Tab 0   • docusate sodium 100 MG Cap Take 100 mg by mouth 2 Times a Day. 60 Cap 0   • ZOLPIDEM TARTRATE PO Take 10 mg by mouth. Takes 0.5 to 1 tab prn at bedtime for sleep     • albuterol 108 (90 Base) MCG/ACT Aero Soln inhalation aerosol Inhale 2 Puffs by mouth every four hours as needed for Shortness of Breath. 1 Inhaler 1   • fluticasone (FLONASE) 50 MCG/ACT nasal spray Spray 2 Sprays in nose every day. 3 Bottle 3     No current facility-administered medications for this visit.        She  has a past medical history of Allergy; Anesthesia; Aphthous stomatitis; Arthritis; Bipolar 2 disorder (HCC); Cancer (HCC); Depression; Hyperlipidemia; Infectious disease; LBP (low back pain); MEDICAL HOME; Narcolepsy and cataplexy; Neck pain; Pain (06/27/2018); and Thyroid cancer (HCC) (1/2012). She also has no past medical history of Breast cancer ([facility]) or Diabetes.      ROS  As stated in HPI and additionally  Gen: +fatigue and insomnia. No F/C, weight loss  Skin: No rashes or lesions  MSK: No joint erythema, edema, or warmth  CV: No chest pain or LE edema      Objective:     Blood pressure 124/76, pulse (!) 101, temperature 36.6 °C (97.9 °F), temperature source Temporal, height 1.588 m (5' 2.5\"), weight 79.4 kg (175 lb), last menstrual period 03/14/2012, SpO2 99 %. Body mass index is 31.5 kg/m².  Physical Exam:  General: Alert, oriented, in no acute distress.  Eye contact is good, speech goal directed, affect calm  CNs grossly intact.  HEENT: conjunctiva non-injected, sclera non-icteric, EOMs intact.   Gross hearing intact.  Neck: Supple. No adenopathy or masses in the cervical or supraclavicular regions. No thyromegaly  Lungs: unlabored. clear to auscultation bilaterally with good " excursion.  CV: regular rate and rhythm. No murmurs.  MSK: Deformity and heberdens nodes DIP joints of bilateral hands   Ext: no edema, normal color and temperature.   Skin: No rashes or lesions in visible areas  Gait steady.     Assessment and Plan:   Assessment/Plan:  1. Bilateral hand pain  Ongoing issue. Suspect this is OA, but will check additional labs and x-rays to confirm. Consider referral to hand specialist for injections.   - RHEUMATOID ARTHRITIS FACTOR; Future  - CCP ANTIBODY; Future  - CHIQUITA REFLEXIVE PROFILE; Future  - WESTERGREN SED RATE; Future  - CRP QUANTITIVE (NON-CARDIAC); Future  - DX-JOINT SURVEY-HANDS SINGLE VIEW; Future  - tramadol (ULTRAM) 50 MG Tab; Take 1-2 Tabs by mouth every 12 hours as needed for Severe Pain (max 75 tabs/month) for up to 30 days.  Dispense: 75 Tab; Refill: 0    2. Bilateral foot pain  Same as #1  - RHEUMATOID ARTHRITIS FACTOR; Future  - CCP ANTIBODY; Future  - CHIQUITA REFLEXIVE PROFILE; Future  - WESTERGREN SED RATE; Future  - CRP QUANTITIVE (NON-CARDIAC); Future  - DX-JOINT SURVEY-FEET SINGLE VIEW; Future  - tramadol (ULTRAM) 50 MG Tab; Take 1-2 Tabs by mouth every 12 hours as needed for Severe Pain (max 75 tabs/month) for up to 30 days.  Dispense: 75 Tab; Refill: 0    3. Arthritis  See #1-2    4. TROY (obstructive sleep apnea)  We will call and get her scheduled for pulmonology f/u     5. Narcolepsy and cataplexy  See #4    6. Postoperative hypothyroidism  Status unk as labs have been through [facility] clinic. Check labs. Will switch to Brand Synthroid d/t intolerance to generic levothyroxine.   - TSH WITH REFLEX TO FT4; Future    7. Postoperative anemia  Had low H&H after surgery need f/u labs   - CBC WITH DIFFERENTIAL; Future    8. Mixed hyperlipidemia  Status unk check labs   - LIPID PROFILE; Future    9. BMI 31.0-31.9,adult  - Patient identified as having weight management issue.  Appropriate orders and counseling given.    10. NSAID long-term use  Start PPI to lessen risk  for GIB complication   - omeprazole (PRILOSEC) 20 MG delayed-release capsule; Take 1 Cap by mouth every day. 30 minutes before breakfast  Dispense: 90 Cap; Refill: 3    11. Visit for screening mammogram  - MA-SCREEN MAMMO W/CAD-BILAT; Future    12. Preventative health care  - COMP METABOLIC PANEL; Future    13. Chronic use of opiate drugs therapeutic purposes  Obtained and reviewed the patient utilization report state pharmacy database on 10/1/2018.  Based on assessment of report prescription for Tramadol is medically necessary. Patient has not requested any early refills and exhibits no abberant behavior. I have advised patient to keep medication and safe place and to not drive, use alcohol, or take illicit drugs while taking this medication.       Follow up:  Return in about 3 months (around 12/27/2018).    Educated in proper administration of medication(s) ordered today including safety, possible SE, risks, benefits, rationale and alternatives to therapy.   Supportive care, differential diagnoses, and indications for immediate follow-up discussed with patient.    Pathogenesis of diagnosis discussed including typical length and natural progression.    Instructed to return to clinic or nearest emergency department for any change in condition, further concerns, or worsening of symptoms.  Patient states understanding of the plan of care and discharge instructions.      Please note that this dictation was created using voice recognition software. I have made every reasonable attempt to correct obvious errors, but I expect that there are errors of grammar and possibly content that I did not discover before finalizing the note.    Followup: Return in about 3 months (around 12/27/2018). sooner should new symptoms or problems arise.

## 2018-10-02 NOTE — TELEPHONE ENCOUNTER
Please call sleep center/pulmonology to get her scheduled for follow up. She cannot seem to get through. She was seeing Dr. Heaton.     DOMENIC Penny.

## 2018-10-02 NOTE — ASSESSMENT & PLAN NOTE
Last dose of narcotic medication: yesterday   Analgesia: 2/10  Activity level: good  Adverse events: none  Aberrant behavior: none  Affect and mood: calm and pleasant   Nonnarcotic treatments that are being used: tizanidine and naproxen with gabapentin and cymbalta  Pain management agreement initiated and signed on: 4/23/18  Most recent urine drug screen done 6/9/18, and is consistent with prescribed medications  Opiate risk score: 2  Total daily opiate dosage: morphine 15 mEq now

## 2018-10-23 RX ORDER — NAPROXEN 500 MG/1
500 TABLET ORAL
Qty: 180 TAB | Refills: 1 | Status: SHIPPED | OUTPATIENT
Start: 2018-10-23 | End: 2019-01-17 | Stop reason: SDUPTHER

## 2018-10-23 NOTE — TELEPHONE ENCOUNTER
Was the patient seen in the last year in this department? Yes LOV 09/27/18    Does patient have an active prescription for medications requested? No     Received Request Via: Pharmacy

## 2018-11-02 ENCOUNTER — APPOINTMENT (OUTPATIENT)
Dept: SLEEP MEDICINE | Facility: MEDICAL CENTER | Age: 48
End: 2018-11-02
Payer: MEDICARE

## 2019-01-31 ENCOUNTER — APPOINTMENT (OUTPATIENT)
Dept: MEDICAL GROUP | Facility: PHYSICIAN GROUP | Age: 49
End: 2019-01-31
Payer: MEDICARE

## 2019-02-22 RX ORDER — TRAMADOL HYDROCHLORIDE 50 MG/1
TABLET ORAL
Qty: 75 TAB | Refills: 0
Start: 2019-02-22

## 2019-03-12 ENCOUNTER — OFFICE VISIT (OUTPATIENT)
Dept: MEDICAL GROUP | Facility: PHYSICIAN GROUP | Age: 49
End: 2019-03-12
Payer: MEDICARE

## 2019-03-12 VITALS
HEIGHT: 63 IN | SYSTOLIC BLOOD PRESSURE: 124 MMHG | DIASTOLIC BLOOD PRESSURE: 82 MMHG | TEMPERATURE: 96.8 F | WEIGHT: 167 LBS | OXYGEN SATURATION: 97 % | BODY MASS INDEX: 29.59 KG/M2 | HEART RATE: 117 BPM

## 2019-03-12 DIAGNOSIS — Z09 HOSPITAL DISCHARGE FOLLOW-UP: ICD-10-CM

## 2019-03-12 DIAGNOSIS — G47.01 INSOMNIA DUE TO MEDICAL CONDITION: ICD-10-CM

## 2019-03-12 DIAGNOSIS — E78.2 MIXED HYPERLIPIDEMIA: ICD-10-CM

## 2019-03-12 DIAGNOSIS — Z79.1 NSAID LONG-TERM USE: ICD-10-CM

## 2019-03-12 DIAGNOSIS — G47.33 OSA (OBSTRUCTIVE SLEEP APNEA): ICD-10-CM

## 2019-03-12 DIAGNOSIS — G89.29 CHRONIC BILATERAL LOW BACK PAIN WITHOUT SCIATICA: ICD-10-CM

## 2019-03-12 DIAGNOSIS — E89.0 POSTOPERATIVE HYPOTHYROIDISM: ICD-10-CM

## 2019-03-12 DIAGNOSIS — M54.50 CHRONIC BILATERAL LOW BACK PAIN WITHOUT SCIATICA: ICD-10-CM

## 2019-03-12 DIAGNOSIS — F31.81 BIPOLAR 2 DISORDER (HCC): ICD-10-CM

## 2019-03-12 DIAGNOSIS — Z91.51 HX OF SUICIDE ATTEMPT: ICD-10-CM

## 2019-03-12 DIAGNOSIS — G62.9 NEUROPATHY: ICD-10-CM

## 2019-03-12 DIAGNOSIS — M19.90 ARTHRITIS: ICD-10-CM

## 2019-03-12 PROBLEM — F11.20 OPIATE DEPENDENCE (HCC): Status: ACTIVE | Noted: 2018-04-23

## 2019-03-12 PROCEDURE — 99214 OFFICE O/P EST MOD 30 MIN: CPT | Performed by: NURSE PRACTITIONER

## 2019-03-12 RX ORDER — ACETAMINOPHEN 500 MG
500-1000 TABLET ORAL EVERY 8 HOURS PRN
Qty: 270 TAB | Refills: 1 | Status: SHIPPED | OUTPATIENT
Start: 2019-03-12

## 2019-03-12 RX ORDER — GABAPENTIN 300 MG/1
CAPSULE ORAL
Qty: 270 CAP | Refills: 3 | Status: SHIPPED | OUTPATIENT
Start: 2019-03-12

## 2019-03-12 RX ORDER — DULOXETIN HYDROCHLORIDE 60 MG/1
60 CAPSULE, DELAYED RELEASE ORAL 2 TIMES DAILY
Qty: 180 CAP | Refills: 3 | Status: SHIPPED | OUTPATIENT
Start: 2019-03-12

## 2019-03-12 RX ORDER — CYCLOBENZAPRINE HCL 5 MG
5-10 TABLET ORAL 3 TIMES DAILY PRN
Qty: 30 TAB | Refills: 0 | Status: SHIPPED | OUTPATIENT
Start: 2019-03-12

## 2019-03-12 RX ORDER — OMEPRAZOLE 20 MG/1
20 CAPSULE, DELAYED RELEASE ORAL DAILY
Qty: 90 CAP | Refills: 3 | Status: SHIPPED | OUTPATIENT
Start: 2019-03-12 | End: 2021-06-13

## 2019-03-12 RX ORDER — HYDROXYZINE HYDROCHLORIDE 25 MG/1
25 TABLET, FILM COATED ORAL
Qty: 90 TAB | Refills: 1 | Status: SHIPPED | OUTPATIENT
Start: 2019-03-12 | End: 2019-09-02 | Stop reason: SDUPTHER

## 2019-03-12 RX ORDER — NAPROXEN 500 MG/1
500 TABLET ORAL
Qty: 180 TAB | Refills: 0 | Status: SHIPPED | OUTPATIENT
Start: 2019-03-12 | End: 2021-06-13

## 2019-03-12 RX ORDER — LEVOTHYROXINE SODIUM 125 MCG
125 TABLET ORAL
Qty: 90 TAB | Refills: 3 | Status: SHIPPED | OUTPATIENT
Start: 2019-03-12 | End: 2019-06-19

## 2019-03-12 NOTE — ASSESSMENT & PLAN NOTE
Last TSH uncontrolled 6 months ago. She used to follow [facility], but now is not being monitored by them. Currently she has been off of thyroid replacement for a week, but prior to this was on levothyroxine 125 mcg daily.

## 2019-03-12 NOTE — ASSESSMENT & PLAN NOTE
Ongoing chronic problem. Pain is in lower back midline. No LE numbness, tingling, or radiating pain. Used to use naproxen, tizanidine, and tramadol for pain, but has been off of treatment.
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
## 2019-03-12 NOTE — ASSESSMENT & PLAN NOTE
The pain is mostly localized to hands and feet.  In the fingers pain is most bothersome site is DIP and PIP joints with nodules. There is minimal pain in MCP joints, but there is numbness along PIP joints. There is never associated erythema, edema, or warmth, but fingers are stiff.   She has nodules in arches of feet. When she does not move around enough, they increase in size and become more painful.   Failed mobic, but naproxen worked well for her.
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
## 2019-03-12 NOTE — ASSESSMENT & PLAN NOTE
Has been hospitalized and prior to this did not have insurance therefore has not been receiving narcotics.

## 2019-04-10 ENCOUNTER — OFFICE VISIT (OUTPATIENT)
Dept: MEDICAL GROUP | Facility: PHYSICIAN GROUP | Age: 49
End: 2019-04-10
Payer: MEDICARE

## 2019-04-10 VITALS
HEART RATE: 125 BPM | SYSTOLIC BLOOD PRESSURE: 118 MMHG | DIASTOLIC BLOOD PRESSURE: 72 MMHG | HEIGHT: 63 IN | OXYGEN SATURATION: 95 % | TEMPERATURE: 98.2 F | WEIGHT: 165 LBS | BODY MASS INDEX: 29.23 KG/M2

## 2019-04-10 DIAGNOSIS — M25.511 ACUTE PAIN OF RIGHT SHOULDER: ICD-10-CM

## 2019-04-10 PROCEDURE — 20610 DRAIN/INJ JOINT/BURSA W/O US: CPT | Mod: RT | Performed by: NURSE PRACTITIONER

## 2019-04-10 RX ADMIN — TRIAMCINOLONE ACETONIDE 80 MG: 40 INJECTION, SUSPENSION INTRA-ARTICULAR; INTRAMUSCULAR at 15:10

## 2019-04-10 ASSESSMENT — PAIN SCALES - GENERAL: PAINLEVEL: 10=SEVERE PAIN

## 2019-04-11 RX ORDER — TRIAMCINOLONE ACETONIDE 40 MG/ML
80 INJECTION, SUSPENSION INTRA-ARTICULAR; INTRAMUSCULAR ONCE
Status: COMPLETED | OUTPATIENT
Start: 2019-04-11 | End: 2019-04-10

## 2019-04-11 NOTE — PROGRESS NOTES
Subjective:     Chief Complaint   Patient presents with   • Shoulder Pain       HPI  Taylor Salamanca is a 48 y.o. female here today for acute right shoulder pain.This is a new acute problem that started 10 days ago. No known injury, but was helping a friend move for a few days before. She just woke up with pain. The pain is over the outside of the shoulder, described as burning.  She is having a difficult time lifting her arm above head.  The pain is constant.  Rates pain 5-9/10.  She cannot lay on her right side to sleep.  There is no associated numbness, tingling, or weakness of the arm.no swelling of the arm.  She is already on naproxen twice daily for pain which is not helping.  Unfortunately, patient is in a very difficult situation in regards to not having a home, and having it be very difficult for her to get rides to places.        The encounter diagnosis was Acute pain of right shoulder.    Allergies: Percocet [perloxx]  Current medicines (including changes today)  Current Outpatient Prescriptions   Medication Sig Dispense Refill   • SYNTHROID 125 MCG Tab Take 1 Tab by mouth Every morning on an empty stomach. 90 Tab 3   • DULoxetine (CYMBALTA) 60 MG Cap DR Particles delayed-release capsule Take 1 Cap by mouth 2 times a day. 180 Cap 3   • omeprazole (PRILOSEC) 20 MG delayed-release capsule Take 1 Cap by mouth every day. 30 minutes before breakfast 90 Cap 3   • cyclobenzaprine (FLEXERIL) 5 MG tablet Take 1-2 Tabs by mouth 3 times a day as needed. 30 Tab 0   • naproxen (NAPROSYN) 500 MG Tab Take 1 Tab by mouth 2 times daily with meals as needed (moderate pain). 180 Tab 0   • gabapentin (NEURONTIN) 300 MG Cap Take 300 mg po in am, and 600 mg po in pm 270 Cap 3   • Diclofenac Sodium (VOLTAREN) 1 % Gel Apply 3 g to skin as directed 2 times a day as needed (hand joint pain). 100 g 2   • acetaminophen (TYLENOL) 500 MG Tab Take 1-2 Tabs by mouth every 8 hours as needed for Moderate Pain. 270 Tab 1   •  "hydrOXYzine HCl (ATARAX) 25 MG Tab Take 1 Tab by mouth at bedtime as needed for Anxiety (insomnia). 90 Tab 1   • albuterol 108 (90 Base) MCG/ACT Aero Soln inhalation aerosol Inhale 2 Puffs by mouth every four hours as needed for Shortness of Breath. 1 Inhaler 1     Current Facility-Administered Medications   Medication Dose Route Frequency Provider Last Rate Last Dose   • triamcinolone acetonide (KENALOG-40) injection 80 mg  80 mg Intra-articular Once James Calvo A.P.R.N.           She  has a past medical history of Allergy; Anesthesia; Aphthous stomatitis; Arthritis; Bipolar 2 disorder (HCC); Cancer (HCC); Depression; Hyperlipidemia; Infectious disease; LBP (low back pain); MEDICAL HOME; Narcolepsy and cataplexy; Neck pain; Pain (06/27/2018); and Thyroid cancer (HCC) (1/2012). She also has no past medical history of Breast cancer (HCC) or Diabetes.        ROS  As stated in HPI     Objective:     /72 (BP Location: Left arm, Patient Position: Sitting, BP Cuff Size: Adult)   Pulse (!) 125   Temp 36.8 °C (98.2 °F)   Ht 1.588 m (5' 2.5\")   Wt 74.8 kg (165 lb)   SpO2 95%  Body mass index is 29.7 kg/m².  Physical Exam:  General: Alert, oriented, in no acute distress.  Eye contact is good, speech goal directed, affect calm  CNs grossly intact.  Gross hearing intact.  Right shoulder: No swelling, deformity, atrophy, erythema. No deformity of clavicle, acromioclavicular joint, borders of scapula, or greater or lesser tuberosities of humerus. +TTP over AC joint. No instability. limited ROM through abduction and some pain with external rotation. FAROM of shoulder joint through forward flexion, extension, internal rotation, and adduction.   Remainding ipsilateral elbow, wrist and hand exam is normal, contralateral shoulder exam is normal.  Rotator Cuff eval:  Empty Can test negative Supraspinatus 5/5  External rotation positive Infraspinatus and teres minor 5/5  Lift off NOT able.    Drop Arm Sign absent  Neer's " Impingement negative  Ext: no edema, normal color and temperature.   Skin: No rashes or lesions in visible areas  Gait steady.     PROCEDURE NOTE.  Discussed risk and benefit and patient agrees to kenalog injection of right shoulder   The joint was prepped with betadine. A 25 guage needle was inserted into the posterior right shoulder joint.   4 cc of 1% lidocaine without epi and 2 cc of kenalog 40mg/ ml was then injected into the joint space. The area was cleaned with alcohol swab and band-aid was applied. Patient tolerated procedure well with no complications      Assessment and Plan:   Assessment/Plan:  1. Acute pain of right shoulder  Acute problem not controlled.  Differentials include bursitis versus tendinitis.  Unfortunately, physical therapy is not a realistic option for her due to her living situation where she does not have a designated home, and is not for sure to be able to get a ride to appointments for outpatient PT.  She is already on oral anti-inflammatories without improvement. Kenalog injection given today.   - triamcinolone acetonide (KENALOG-40) injection 80 mg; 2 mL by Intra-articular route Once.       Follow up:  Return in about 2 weeks (around 4/24/2019).    Educated in proper administration of medication(s) ordered today including safety, possible SE, risks, benefits, rationale and alternatives to therapy.   Supportive care, differential diagnoses, and indications for immediate follow-up discussed with patient.    Pathogenesis of diagnosis discussed including typical length and natural progression.    Instructed to return to clinic or nearest emergency department for any change in condition, further concerns, or worsening of symptoms.  Patient states understanding of the plan of care and discharge instructions.      Please note that this dictation was created using voice recognition software. I have made every reasonable attempt to correct obvious errors, but I expect that there are errors  of grammar and possibly content that I did not discover before finalizing the note.    Followup: Return in about 2 weeks (around 4/24/2019). sooner should new symptoms or problems arise.

## 2019-04-25 ENCOUNTER — APPOINTMENT (OUTPATIENT)
Dept: MEDICAL GROUP | Facility: PHYSICIAN GROUP | Age: 49
End: 2019-04-25
Payer: MEDICARE

## 2019-06-07 ENCOUNTER — TELEPHONE (OUTPATIENT)
Dept: MEDICAL GROUP | Facility: PHYSICIAN GROUP | Age: 49
End: 2019-06-07

## 2019-06-07 NOTE — TELEPHONE ENCOUNTER
Umesh patient calling to ask for Cortizone injection in hip. Says James Calvo did this for her in the past and now that James is gone, she needs it done again. Per Roni, send phone encounter to Tere Mena/MA and ask if she would be able to do this for the patient next week. Let patient know one way or the other on Monday 6/10 or 6/11/19 if possible. [phone] Taylor

## 2019-06-10 NOTE — TELEPHONE ENCOUNTER
Please let patient know that I do not do hip injections.  I am not sure if Dr. Duff or Dr. Willard do this.   LINDA Hagen (Routing comment)

## 2019-06-10 NOTE — TELEPHONE ENCOUNTER
Phone Number Called: [phone] (home)       Call outcome: spoke to patient regarding message below    Message: patient informed

## 2019-06-18 ENCOUNTER — HOSPITAL ENCOUNTER (OUTPATIENT)
Facility: MEDICAL CENTER | Age: 49
End: 2019-06-18
Attending: PHYSICIAN ASSISTANT
Payer: MEDICARE

## 2019-06-18 ENCOUNTER — HOSPITAL ENCOUNTER (OUTPATIENT)
Dept: LAB | Facility: MEDICAL CENTER | Age: 49
End: 2019-06-18
Attending: NURSE PRACTITIONER
Payer: MEDICARE

## 2019-06-18 DIAGNOSIS — G62.9 NEUROPATHY: ICD-10-CM

## 2019-06-18 DIAGNOSIS — E78.2 MIXED HYPERLIPIDEMIA: ICD-10-CM

## 2019-06-18 DIAGNOSIS — E89.0 POSTOPERATIVE HYPOTHYROIDISM: ICD-10-CM

## 2019-06-18 LAB
BASOPHILS # BLD AUTO: 0.9 % (ref 0–1.8)
BASOPHILS # BLD: 0.05 K/UL (ref 0–0.12)
CHOLEST SERPL-MCNC: 185 MG/DL (ref 100–199)
CRP SERPL HS-MCNC: 0.06 MG/DL (ref 0–0.75)
EOSINOPHIL # BLD AUTO: 0.06 K/UL (ref 0–0.51)
EOSINOPHIL NFR BLD: 1.1 % (ref 0–6.9)
ERYTHROCYTE [DISTWIDTH] IN BLOOD BY AUTOMATED COUNT: 45 FL (ref 35.9–50)
ERYTHROCYTE [SEDIMENTATION RATE] IN BLOOD BY WESTERGREN METHOD: 5 MM/HOUR (ref 0–30)
FASTING STATUS PATIENT QL REPORTED: NORMAL
HCT VFR BLD AUTO: 42.9 % (ref 37–47)
HDLC SERPL-MCNC: 49 MG/DL
HGB BLD-MCNC: 13.6 G/DL (ref 12–16)
IMM GRANULOCYTES # BLD AUTO: 0.01 K/UL (ref 0–0.11)
IMM GRANULOCYTES NFR BLD AUTO: 0.2 % (ref 0–0.9)
LDLC SERPL CALC-MCNC: 114 MG/DL
LYMPHOCYTES # BLD AUTO: 1.86 K/UL (ref 1–4.8)
LYMPHOCYTES NFR BLD: 33.2 % (ref 22–41)
MCH RBC QN AUTO: 30.3 PG (ref 27–33)
MCHC RBC AUTO-ENTMCNC: 31.7 G/DL (ref 33.6–35)
MCV RBC AUTO: 95.5 FL (ref 81.4–97.8)
MONOCYTES # BLD AUTO: 0.6 K/UL (ref 0–0.85)
MONOCYTES NFR BLD AUTO: 10.7 % (ref 0–13.4)
NEUTROPHILS # BLD AUTO: 3.03 K/UL (ref 2–7.15)
NEUTROPHILS NFR BLD: 53.9 % (ref 44–72)
NRBC # BLD AUTO: 0 K/UL
NRBC BLD-RTO: 0 /100 WBC
PLATELET # BLD AUTO: 280 K/UL (ref 164–446)
PMV BLD AUTO: 10.3 FL (ref 9–12.9)
RBC # BLD AUTO: 4.49 M/UL (ref 4.2–5.4)
T4 FREE SERPL-MCNC: 1.09 NG/DL (ref 0.53–1.43)
TRIGL SERPL-MCNC: 110 MG/DL (ref 0–149)
TSH SERPL DL<=0.005 MIU/L-ACNC: 8.28 UIU/ML (ref 0.38–5.33)
VIT B12 SERPL-MCNC: 378 PG/ML (ref 211–911)
WBC # BLD AUTO: 5.6 K/UL (ref 4.8–10.8)

## 2019-06-18 PROCEDURE — 84439 ASSAY OF FREE THYROXINE: CPT

## 2019-06-18 PROCEDURE — 84443 ASSAY THYROID STIM HORMONE: CPT

## 2019-06-18 PROCEDURE — 86140 C-REACTIVE PROTEIN: CPT

## 2019-06-18 PROCEDURE — 85652 RBC SED RATE AUTOMATED: CPT

## 2019-06-18 PROCEDURE — 36415 COLL VENOUS BLD VENIPUNCTURE: CPT

## 2019-06-18 PROCEDURE — 82607 VITAMIN B-12: CPT

## 2019-06-18 PROCEDURE — 80061 LIPID PANEL: CPT

## 2019-06-18 PROCEDURE — 85025 COMPLETE CBC W/AUTO DIFF WBC: CPT

## 2019-06-19 DIAGNOSIS — E89.0 POSTOPERATIVE HYPOTHYROIDISM: ICD-10-CM

## 2019-06-19 DIAGNOSIS — E89.0 POSTABLATIVE HYPOTHYROIDISM: ICD-10-CM

## 2019-06-19 RX ORDER — LEVOTHYROXINE SODIUM 0.15 MG/1
150 TABLET ORAL
Qty: 30 TAB | Refills: 2 | Status: SHIPPED | OUTPATIENT
Start: 2019-06-19 | End: 2019-07-24 | Stop reason: SDUPTHER

## 2019-06-24 RX ORDER — NAPROXEN 500 MG/1
500 TABLET ORAL
Qty: 180 TAB | Refills: 0 | Status: SHIPPED | OUTPATIENT
Start: 2019-06-24 | End: 2021-06-13

## 2019-06-24 NOTE — TELEPHONE ENCOUNTER
Was the patient seen in the last year in this department? Yes LOV 4/10/19    Does patient have an active prescription for medications requested? No     Received Request Via: Pharmacy

## 2019-07-25 RX ORDER — LEVOTHYROXINE SODIUM 150 MCG
TABLET ORAL
Qty: 90 TAB | Refills: 0 | Status: SHIPPED | OUTPATIENT
Start: 2019-07-25 | End: 2019-10-21 | Stop reason: SDUPTHER

## 2019-07-27 ENCOUNTER — OFFICE VISIT (OUTPATIENT)
Dept: URGENT CARE | Facility: PHYSICIAN GROUP | Age: 49
End: 2019-07-27
Payer: MEDICARE

## 2019-07-27 VITALS
OXYGEN SATURATION: 95 % | TEMPERATURE: 97 F | RESPIRATION RATE: 18 BRPM | SYSTOLIC BLOOD PRESSURE: 116 MMHG | BODY MASS INDEX: 29.44 KG/M2 | HEIGHT: 62 IN | HEART RATE: 93 BPM | WEIGHT: 160 LBS | DIASTOLIC BLOOD PRESSURE: 74 MMHG

## 2019-07-27 DIAGNOSIS — J01.00 ACUTE MAXILLARY SINUSITIS, RECURRENCE NOT SPECIFIED: ICD-10-CM

## 2019-07-27 PROCEDURE — 99214 OFFICE O/P EST MOD 30 MIN: CPT | Performed by: FAMILY MEDICINE

## 2019-07-27 RX ORDER — AMOXICILLIN AND CLAVULANATE POTASSIUM 875; 125 MG/1; MG/1
1 TABLET, FILM COATED ORAL 2 TIMES DAILY
Qty: 14 TAB | Refills: 0 | Status: SHIPPED | OUTPATIENT
Start: 2019-07-27 | End: 2019-08-03

## 2019-07-27 RX ORDER — FLUTICASONE PROPIONATE 50 MCG
1 SPRAY, SUSPENSION (ML) NASAL 2 TIMES DAILY
Qty: 1 BOTTLE | Refills: 0 | Status: SHIPPED | OUTPATIENT
Start: 2019-07-27 | End: 2021-06-13

## 2019-07-28 NOTE — PROGRESS NOTES
Chief Complaint   Patient presents with   • Nasal Congestion     sinus pressure and pain x 1 week          Sinusitis  This is a new problem. The current episode started in the past 3 wks. The problem has been gradually worsening since onset. There has been no fever. Associated symptoms include congestion, coughing, headaches and sinus pressure. Pertinent negatives include no sneezing or sore throat. Past treatments include nothing.     Social History   Substance Use Topics   • Smoking status: Current Some Day Smoker     Packs/day: 0.25     Years: 10.00     Types: Cigarettes   • Smokeless tobacco: Never Used      Comment: started 7/09   • Alcohol use 0.0 oz/week      Comment: Socially         Current Outpatient Prescriptions on File Prior to Visit   Medication Sig Dispense Refill   • SYNTHROID 150 MCG Tab TAKE 1 TABLET BY MOUTH EVERY MORNING ON AN EMPTY STOMACH 90 Tab 0   • naproxen (NAPROSYN) 500 MG Tab TAKE 1 TAB BY MOUTH 2 TIMES DAILY WITH MEALS AS NEEDED (MODERATE PAIN). 180 Tab 0   • DULoxetine (CYMBALTA) 60 MG Cap DR Particles delayed-release capsule Take 1 Cap by mouth 2 times a day. 180 Cap 3   • omeprazole (PRILOSEC) 20 MG delayed-release capsule Take 1 Cap by mouth every day. 30 minutes before breakfast 90 Cap 3   • cyclobenzaprine (FLEXERIL) 5 MG tablet Take 1-2 Tabs by mouth 3 times a day as needed. 30 Tab 0   • naproxen (NAPROSYN) 500 MG Tab Take 1 Tab by mouth 2 times daily with meals as needed (moderate pain). 180 Tab 0   • gabapentin (NEURONTIN) 300 MG Cap Take 300 mg po in am, and 600 mg po in pm 270 Cap 3   • hydrOXYzine HCl (ATARAX) 25 MG Tab Take 1 Tab by mouth at bedtime as needed for Anxiety (insomnia). 90 Tab 1   • Diclofenac Sodium (VOLTAREN) 1 % Gel Apply 3 g to skin as directed 2 times a day as needed (hand joint pain). 100 g 2   • acetaminophen (TYLENOL) 500 MG Tab Take 1-2 Tabs by mouth every 8 hours as needed for Moderate Pain. 270 Tab 1   • albuterol 108 (90 Base) MCG/ACT Aero Soln  "inhalation aerosol Inhale 2 Puffs by mouth every four hours as needed for Shortness of Breath. 1 Inhaler 1     No current facility-administered medications on file prior to visit.            Allergies   Allergen Reactions   • Percocet [Perloxx] Itching     .         Family history was reviewed and not pertinent     Review of Systems   Constitutional: Negative for fever.   HENT: Positive for congestion and sinus pressure. Negative for sneezing and sore throat.    Respiratory: Positive for cough.    Neurological: Positive for headaches.   All other systems reviewed and are negative.         Objective:     /74 (BP Location: Left arm, Patient Position: Sitting, BP Cuff Size: Large adult)   Pulse 93   Temp 36.1 °C (97 °F) (Temporal)   Resp 18   Ht 1.575 m (5' 2\")   Wt 72.6 kg (160 lb)   SpO2 95%       Physical Exam   Constitutional: patient is oriented to person, place, and time. Patient appears well-developed and well-nourished.   HENT:   Head: Normocephalic and atraumatic.   Right Ear: Hearing, tympanic membrane and external ear normal.   Left Ear: Hearing, tympanic membrane and external ear normal.   Nose: Mucosal edema and rhinorrhea present.  . No epistaxis.  Left and right sinus tenderness noted  Mouth/Throat: Uvula is midline and mucous membranes are normal. Oropharyngeal exudate present. No posterior oropharyngeal edema or posterior oropharyngeal erythema.   Eyes: Conjunctivae and EOM are normal. Pupils are equal, round, and reactive to light. Left and right eyes - no discharge. No scleral icterus.   Neck: Normal range of motion. Neck supple. No JVD present. No tracheal deviation present. No thyromegaly present.   Cardiovascular: Normal rate, regular rhythm, normal heart sounds and intact distal pulses.    No murmur heard.  Pulmonary/Chest: Breath sounds normal. No respiratory distress.   no wheezes, rales.      Musculoskeletal: Normal range of motion.   no edema.   Lymphadenopathy:     There is " positive  cervical adenopathy.   Neurological:   alert and oriented to person, place, and time.   Skin: Skin is warm and dry. No erythema.   Psychiatric:   normal mood and affect.  behavior is normal.   Nursing note and vitals reviewed.          Assessment/Plan:     1. Acute maxillary sinusitis, recurrence not specified     - amoxicillin-clavulanate (AUGMENTIN) 875-125 MG Tab; Take 1 Tab by mouth 2 times a day for 7 days.  Dispense: 14 Tab; Refill: 0  - fluticasone (FLONASE) 50 MCG/ACT nasal spray; Spray 1 Spray in nose 2 times a day.  Dispense: 1 Bottle; Refill: 0  - Alum & Mag Hydroxide-Simeth (MBX) Suspension; Take 5 mL by mouth every 6 hours as needed.  Dispense: 150 mL; Refill: 0      *pt has left hip arthritis and is using crutches and she lost one of them, so a single crutch was replaced today.

## 2019-08-08 ENCOUNTER — HOSPITAL ENCOUNTER (OUTPATIENT)
Dept: HOSPITAL 8 - STAR | Age: 49
Discharge: HOME | End: 2019-08-08
Attending: ORTHOPAEDIC SURGERY
Payer: MEDICARE

## 2019-08-08 DIAGNOSIS — T84.84XA: ICD-10-CM

## 2019-08-08 DIAGNOSIS — Z01.818: Primary | ICD-10-CM

## 2019-08-08 DIAGNOSIS — M16.11: ICD-10-CM

## 2019-08-08 DIAGNOSIS — Z96.641: ICD-10-CM

## 2019-08-08 PROCEDURE — 36415 COLL VENOUS BLD VENIPUNCTURE: CPT

## 2019-08-08 PROCEDURE — 93005 ELECTROCARDIOGRAM TRACING: CPT

## 2019-08-08 PROCEDURE — 85730 THROMBOPLASTIN TIME PARTIAL: CPT

## 2019-08-08 PROCEDURE — 85610 PROTHROMBIN TIME: CPT

## 2019-08-08 PROCEDURE — 87081 CULTURE SCREEN ONLY: CPT

## 2019-08-08 PROCEDURE — 80053 COMPREHEN METABOLIC PANEL: CPT

## 2019-08-08 PROCEDURE — 83036 HEMOGLOBIN GLYCOSYLATED A1C: CPT

## 2019-08-08 PROCEDURE — G0475 HIV COMBINATION ASSAY: HCPCS

## 2019-08-08 PROCEDURE — 87806 HIV AG W/HIV1&2 ANTB W/OPTIC: CPT

## 2019-08-08 PROCEDURE — 85025 COMPLETE CBC W/AUTO DIFF WBC: CPT

## 2019-08-12 ENCOUNTER — HOSPITAL ENCOUNTER (INPATIENT)
Dept: HOSPITAL 8 - OUT | Age: 49
LOS: 1 days | Discharge: HOME | DRG: 468 | End: 2019-08-13
Attending: ORTHOPAEDIC SURGERY | Admitting: ORTHOPAEDIC SURGERY
Payer: MEDICARE

## 2019-08-12 VITALS — BODY MASS INDEX: 30.87 KG/M2 | HEIGHT: 62 IN | WEIGHT: 167.77 LBS

## 2019-08-12 DIAGNOSIS — F41.9: ICD-10-CM

## 2019-08-12 DIAGNOSIS — T84.030A: Primary | ICD-10-CM

## 2019-08-12 PROCEDURE — C1776 JOINT DEVICE (IMPLANTABLE): HCPCS

## 2019-08-12 PROCEDURE — 72170 X-RAY EXAM OF PELVIS: CPT

## 2019-08-12 PROCEDURE — 0T9B80Z DRAINAGE OF BLADDER WITH DRAINAGE DEVICE, VIA NATURAL OR ARTIFICIAL OPENING ENDOSCOPIC: ICD-10-PCS | Performed by: ORTHOPAEDIC SURGERY

## 2019-08-12 PROCEDURE — C1762 CONN TISS, HUMAN(INC FASCIA): HCPCS

## 2019-08-12 PROCEDURE — 0SR906A REPLACEMENT OF RIGHT HIP JOINT WITH OXIDIZED ZIRCONIUM ON POLYETHYLENE SYNTHETIC SUBSTITUTE, UNCEMENTED, OPEN APPROACH: ICD-10-PCS | Performed by: ORTHOPAEDIC SURGERY

## 2019-08-12 PROCEDURE — 36415 COLL VENOUS BLD VENIPUNCTURE: CPT

## 2019-08-12 PROCEDURE — 0SP90JZ REMOVAL OF SYNTHETIC SUBSTITUTE FROM RIGHT HIP JOINT, OPEN APPROACH: ICD-10-PCS | Performed by: ORTHOPAEDIC SURGERY

## 2019-08-12 PROCEDURE — 85014 HEMATOCRIT: CPT

## 2019-08-12 PROCEDURE — 86850 RBC ANTIBODY SCREEN: CPT

## 2019-08-12 PROCEDURE — 85018 HEMOGLOBIN: CPT

## 2019-08-12 PROCEDURE — 86900 BLOOD TYPING SEROLOGIC ABO: CPT

## 2019-08-12 PROCEDURE — C1713 ANCHOR/SCREW BN/BN,TIS/BN: HCPCS

## 2019-09-02 DIAGNOSIS — G47.01 INSOMNIA DUE TO MEDICAL CONDITION: ICD-10-CM

## 2019-09-03 RX ORDER — HYDROXYZINE HYDROCHLORIDE 25 MG/1
25 TABLET, FILM COATED ORAL
Qty: 90 TAB | Refills: 1 | Status: SHIPPED | OUTPATIENT
Start: 2019-09-03 | End: 2021-06-13

## 2019-09-03 NOTE — TELEPHONE ENCOUNTER
Was the patient seen in the last year in this department? Yes LOV 04/10/19 W/JOSE SCHEDULE TO ESTABLISH CARE WITH BLANCA TIJERINA ON 10/09/19    Does patient have an active prescription for medications requested? No     Received Request Via: Pharmacy

## 2019-10-22 RX ORDER — LEVOTHYROXINE SODIUM 150 MCG
TABLET ORAL
Qty: 90 TAB | Refills: 0 | Status: SHIPPED | OUTPATIENT
Start: 2019-10-22

## 2019-11-14 ENCOUNTER — HOSPITAL ENCOUNTER (EMERGENCY)
Facility: MEDICAL CENTER | Age: 49
End: 2019-11-14
Attending: EMERGENCY MEDICINE
Payer: MEDICARE

## 2019-11-14 VITALS
TEMPERATURE: 97.7 F | DIASTOLIC BLOOD PRESSURE: 76 MMHG | SYSTOLIC BLOOD PRESSURE: 113 MMHG | HEART RATE: 110 BPM | WEIGHT: 155 LBS | OXYGEN SATURATION: 95 % | HEIGHT: 62 IN | BODY MASS INDEX: 28.52 KG/M2 | RESPIRATION RATE: 20 BRPM

## 2019-11-14 DIAGNOSIS — J06.9 UPPER RESPIRATORY TRACT INFECTION, UNSPECIFIED TYPE: ICD-10-CM

## 2019-11-14 DIAGNOSIS — R05.9 COUGH: ICD-10-CM

## 2019-11-14 PROCEDURE — A9270 NON-COVERED ITEM OR SERVICE: HCPCS | Performed by: EMERGENCY MEDICINE

## 2019-11-14 PROCEDURE — 700102 HCHG RX REV CODE 250 W/ 637 OVERRIDE(OP): Performed by: EMERGENCY MEDICINE

## 2019-11-14 PROCEDURE — 99284 EMERGENCY DEPT VISIT MOD MDM: CPT

## 2019-11-14 RX ORDER — IBUPROFEN 600 MG/1
600 TABLET ORAL ONCE
Status: COMPLETED | OUTPATIENT
Start: 2019-11-14 | End: 2019-11-14

## 2019-11-14 RX ORDER — ACETAMINOPHEN 325 MG/1
650 TABLET ORAL ONCE
Status: COMPLETED | OUTPATIENT
Start: 2019-11-14 | End: 2019-11-14

## 2019-11-14 RX ADMIN — IBUPROFEN 600 MG: 600 TABLET ORAL at 14:32

## 2019-11-14 RX ADMIN — ACETAMINOPHEN 650 MG: 325 TABLET, FILM COATED ORAL at 14:32

## 2019-11-14 NOTE — ED PROVIDER NOTES
ED Provider Note    Scribed for Jada Odonnell M.D. by Viridiana Armas. 11/14/2019, 2:07 PM.    Primary Care Provider: LINDA Penny  Means of arrival: Walk-In  History obtained from: Patient  History limited by: None    CHIEF COMPLAINT  Chief Complaint   Patient presents with   • Cough     x5 days   • Sore Throat   • Body Aches     HPI  Taylor Salamanca is a 49 y.o. female who presents to the Emergency Department complaining of cold symptoms including generalized body aches, productive cough, and sore throat onset 5 days ago that have progressively worsened. Patient admits to smoking about 1 cigarette a day but otherwise denies any illicit drug use.     REVIEW OF SYSTEMS  Pertinent positives include body aches, cough, sputum production, sore throat. Pertinent negatives include no nausea, vomiting, fever.      PAST MEDICAL HISTORY   has a past medical history of Allergy, Anesthesia, Aphthous stomatitis, Arthritis, Bipolar 2 disorder (HCC), Cancer (HCC), Depression, Hyperlipidemia, Infectious disease, LBP (low back pain), MEDICAL HOME, Narcolepsy and cataplexy, Neck pain, Pain (06/27/2018), and Thyroid cancer (HCC) (1/2012).    SOCIAL HISTORY  Social History     Tobacco Use   • Smoking status: Current Some Day Smoker     Packs/day: 0.25     Years: 10.00     Pack years: 2.50     Types: Cigarettes   • Smokeless tobacco: Never Used   • Tobacco comment: started 7/09   Substance Use Topics   • Alcohol use: Yes     Alcohol/week: 0.0 oz     Comment: Socially   • Drug use: No      Social History     Substance and Sexual Activity   Drug Use No       SURGICAL HISTORY   has a past surgical history that includes gyn surgery; tubal ligation; breast biopsy; thyroidectomy total (3/14/2012); node dissection (3/14/2012); submandible abscess incision and drainage (Right, 8/11/2016); dental extraction(s) (Right, 8/11/2016); and hip arthroplasty total (Right, 7/11/2018).     CURRENT MEDICATIONS  No current  "facility-administered medications for this encounter.     Current Outpatient Medications:   •  SYNTHROID 150 MCG Tab, TAKE 1 TABLET BY MOUTH EVERY MORNING ON AN EMPTY STOMACH, Disp: 90 Tab, Rfl: 0  •  hydrOXYzine HCl (ATARAX) 25 MG Tab, TAKE 1 TAB BY MOUTH AT BEDTIME AS NEEDED FOR ANXIETY (INSOMNIA)., Disp: 90 Tab, Rfl: 1  •  fluticasone (FLONASE) 50 MCG/ACT nasal spray, Spray 1 Spray in nose 2 times a day., Disp: 1 Bottle, Rfl: 0  •  Alum & Mag Hydroxide-Simeth (MBX) Suspension, Take 5 mL by mouth every 6 hours as needed., Disp: 150 mL, Rfl: 0  •  naproxen (NAPROSYN) 500 MG Tab, TAKE 1 TAB BY MOUTH 2 TIMES DAILY WITH MEALS AS NEEDED (MODERATE PAIN)., Disp: 180 Tab, Rfl: 0  •  DULoxetine (CYMBALTA) 60 MG Cap DR Particles delayed-release capsule, Take 1 Cap by mouth 2 times a day., Disp: 180 Cap, Rfl: 3  •  omeprazole (PRILOSEC) 20 MG delayed-release capsule, Take 1 Cap by mouth every day. 30 minutes before breakfast, Disp: 90 Cap, Rfl: 3  •  cyclobenzaprine (FLEXERIL) 5 MG tablet, Take 1-2 Tabs by mouth 3 times a day as needed., Disp: 30 Tab, Rfl: 0  •  naproxen (NAPROSYN) 500 MG Tab, Take 1 Tab by mouth 2 times daily with meals as needed (moderate pain)., Disp: 180 Tab, Rfl: 0  •  gabapentin (NEURONTIN) 300 MG Cap, Take 300 mg po in am, and 600 mg po in pm, Disp: 270 Cap, Rfl: 3  •  Diclofenac Sodium (VOLTAREN) 1 % Gel, Apply 3 g to skin as directed 2 times a day as needed (hand joint pain)., Disp: 100 g, Rfl: 2  •  acetaminophen (TYLENOL) 500 MG Tab, Take 1-2 Tabs by mouth every 8 hours as needed for Moderate Pain., Disp: 270 Tab, Rfl: 1  •  albuterol 108 (90 Base) MCG/ACT Aero Soln inhalation aerosol, Inhale 2 Puffs by mouth every four hours as needed for Shortness of Breath., Disp: 1 Inhaler, Rfl: 1    ALLERGIES  Allergies   Allergen Reactions   • Percocet [Perloxx] Itching     .       PHYSICAL EXAM  VITAL SIGNS: /69   Pulse (!) 114   Temp 36.2 °C (97.2 °F) (Temporal)   Resp 20   Ht 1.575 m (5' 2\")   " Wt 70.3 kg (155 lb)   LMP 03/14/2012   SpO2 93%   BMI 28.35 kg/m²   Constitutional: Restless in bed. Alert in no apparent distress. Well apearing  HENT: Posterior pharynx normal. Normocephalic, Atraumatic, Bilateral external ears normal. Nose normal.   Eyes:  Conjunctiva normal, non-icteric.   Lungs: Non-labored respirations clear to auscultation bilaterally  Heart: Regular rate and rhythm no murmurs rubs or gallops  Skin: Warm, Dry, No erythema, No rash.   Neurologic: Alert, Grossly non-focal.   Psychiatric: Slightly agitated restless      COURSE & MEDICAL DECISION MAKING  Pertinent Labs & Imaging studies reviewed. (See chart for details)    2:07 PM - Patient seen and examined at bedside. Reassured the patient that her lungs sound clear on physical exam and there is no evidence of pneumonia. Her symptoms are consistent with viral illness. She understands that antibiotics will not help and her immune system will need to fight this on her own. She is requesting Flexeril however she is advised to take Tylenol and Ibuprofen for pain as needed. Patient will be treated with Motrin 600 mg and Tylenol 650 mg and then discharged home after interventions. Patient verbalized her understanding and agrees with the plan.  Please note patient was discharged with slightly tachycardic vital signs however she was very restless and upset.        The patient will return for new or worsening symptoms and is stable at the time of discharge. Patient was given return precautions. Patient and/or family member verbalizes understanding and will comply.    DISPOSITION:  Patient will be discharged home in stable condition.    FOLLOW UP:  James Calvo, CASSIE.P.R.N.  [address]  [address]  [facility] [id number] [phone]    In 1 week      [facility], Emergency Dept  [address] [facility] [address] [phone]    Return for worsening difficulty breathing, fevers or other concerns      OUTPATIENT  MEDICATIONS:  Discharge Medication List as of 11/14/2019  2:26 PM            FINAL IMPRESSION  1. Upper respiratory tract infection, unspecified type    2. Cough         This dictation has been created using voice recognition software and/or scribes. The accuracy of the dictation is limited by the abilities of the software and the expertise of the scribes. I expect there may be some errors of grammar and possibly content. I made every attempt to manually correct the errors within my dictation. However, errors related to voice recognition software and/or scribes may still exist and should be interpreted within the appropriate context.     Viridiana ZIMMER (Scribe), am scribing for, and in the presence of, Jada Odonnell M.D..    Electronically signed by: Viridiana Armas (Scribdion), 11/14/2019    Jada ZIMMER M.D. personally performed the services described in this documentation, as scribed by Viridiana Armas in my presence, and it is both accurate and complete. E    The note accurately reflects work and decisions made by me.  Jada Odonnell  11/14/2019  2:49 PM

## 2019-11-14 NOTE — ED NOTES
Pt vu dcis with strict return precautions and follow up with PCP. Pt left alert oriented, ambulatory to discharge. Pt left with friend.

## 2019-11-14 NOTE — ED TRIAGE NOTES
Wheeled to triage  Chief Complaint   Patient presents with   • Cough     x5 days   • Sore Throat   • Body Aches     Mask applied in triage, afebrile.

## 2019-12-04 RX ORDER — NAPROXEN 500 MG/1
500 TABLET ORAL
Refills: 0 | OUTPATIENT
Start: 2019-12-04

## 2020-02-26 RX ORDER — LEVOTHYROXINE SODIUM 150 MCG
150 TABLET ORAL EVERY MORNING
Qty: 90 TAB | Refills: 0 | OUTPATIENT
Start: 2020-02-26

## 2020-02-26 NOTE — TELEPHONE ENCOUNTER
Received request via: Pharmacy    Was the patient seen in the last year in this department? Yes7/27/19    Does the patient have an active prescription (recently filled or refills available) for medication(s) requested? No

## 2021-04-19 ENCOUNTER — APPOINTMENT (OUTPATIENT)
Dept: SLEEP MEDICINE | Facility: MEDICAL CENTER | Age: 51
End: 2021-04-19
Payer: COMMERCIAL

## 2021-06-13 ENCOUNTER — OFFICE VISIT (OUTPATIENT)
Dept: URGENT CARE | Facility: CLINIC | Age: 51
End: 2021-06-13
Payer: COMMERCIAL

## 2021-06-13 VITALS
OXYGEN SATURATION: 98 % | DIASTOLIC BLOOD PRESSURE: 90 MMHG | SYSTOLIC BLOOD PRESSURE: 140 MMHG | RESPIRATION RATE: 20 BRPM | WEIGHT: 155.2 LBS | HEIGHT: 62 IN | BODY MASS INDEX: 28.56 KG/M2 | HEART RATE: 128 BPM | TEMPERATURE: 98 F

## 2021-06-13 DIAGNOSIS — M79.89 LEG SWELLING: ICD-10-CM

## 2021-06-13 PROCEDURE — 99214 OFFICE O/P EST MOD 30 MIN: CPT | Performed by: PHYSICIAN ASSISTANT

## 2021-06-13 RX ORDER — LEVOTHYROXINE SODIUM 137 UG/1
TABLET ORAL
COMMUNITY
Start: 2021-03-20

## 2021-06-13 RX ORDER — CELECOXIB 100 MG/1
100 CAPSULE ORAL
COMMUNITY
Start: 2021-03-29

## 2021-06-13 ASSESSMENT — ENCOUNTER SYMPTOMS
HEADACHES: 0
VOMITING: 0
CONSTIPATION: 0
COUGH: 0
MYALGIAS: 0
NAUSEA: 0
FEVER: 0
SHORTNESS OF BREATH: 0
SORE THROAT: 0
EYE PAIN: 0
ABDOMINAL PAIN: 0
DIARRHEA: 0
CHILLS: 0

## 2021-06-14 ENCOUNTER — HOSPITAL ENCOUNTER (OUTPATIENT)
Dept: RADIOLOGY | Facility: MEDICAL CENTER | Age: 51
End: 2021-06-14
Attending: PHYSICIAN ASSISTANT
Payer: COMMERCIAL

## 2021-06-14 ENCOUNTER — TELEPHONE (OUTPATIENT)
Dept: URGENT CARE | Facility: CLINIC | Age: 51
End: 2021-06-14

## 2021-06-14 DIAGNOSIS — M79.89 LEG SWELLING: ICD-10-CM

## 2021-06-14 PROCEDURE — 93971 EXTREMITY STUDY: CPT | Mod: LT

## 2021-06-14 NOTE — TELEPHONE ENCOUNTER
Telephoned patient with US results.  She has follow up tomorrow with her primary care provider who can guide possible therapeutic options. No blood thinners indicated at this point. Discussed differential of lymph nodes in groin including possibility of infection.  Advised Emergency Room if fever develops.      Tom Howard P.A.-C.

## 2021-06-14 NOTE — PROGRESS NOTES
"Subjective:   Taylor Salamanca is a 50 y.o. female who presents for Leg Pain (x 3 weeks ago - The patient stated \"the left leg swelled up and now there's a lump behind the knee, it's also hard to bend the knee\".)      HPI:  This is a 50-year-old female who has had development of posterior knee swelling as well as calf pain and swelling of the left leg that is been waxing and waning but seemingly getting worse over the last 3 weeks.  She denies any specific injury or trauma.  She has no history of venous thromboembolism, active cancer, recent travel, recent trauma or surgery.  She notes that the swelling behind the knee seems to get worse and obstruct her ability to flex the knee.  She denies any numbness or tingling.  She notes that she has an upcoming primary care provider appointment in 2 days.  She denies any chest pain or shortness of breath    Review of Systems   Constitutional: Negative for chills and fever.   HENT: Negative for congestion, ear pain and sore throat.    Eyes: Negative for pain.   Respiratory: Negative for cough and shortness of breath.    Cardiovascular: Positive for leg swelling. Negative for chest pain.   Gastrointestinal: Negative for abdominal pain, constipation, diarrhea, nausea and vomiting.   Genitourinary: Negative for dysuria.   Musculoskeletal: Negative for myalgias.   Skin: Negative for rash.   Neurological: Negative for headaches.       Medications:    • acetaminophen Tabs  • albuterol Aers  • cyclobenzaprine  • Diclofenac Sodium Gel  • DULoxetine Cpep  • fluticasone  • gabapentin Caps  • hydrOXYzine HCl Tabs  • MBX Susp  • naproxen Tabs  • omeprazole  • Synthroid Tabs    Allergies: Percocet [perloxx]    Problem List: Taylor Salamanca does not have any pertinent problems on file.    Surgical History:  Past Surgical History:   Procedure Laterality Date   • HIP ARTHROPLASTY TOTAL Right 7/11/2018    Procedure: HIP ARTHROPLASTY TOTAL;  Surgeon: Colin Hawley M.D.;  Location: SURGERY " [facility];  Service: Orthopedics   • SUBMANDIBLE ABSCESS INCISION AND DRAINAGE Right 8/11/2016    Procedure: SUBMANDIBLE ABSCESS INCISION AND DRAINAGE;  Surgeon: Chun Newman D.D.S.;  Location: SURGERY [facility];  Service:    • DENTAL EXTRACTION(S) Right 8/11/2016    Procedure: DENTAL EXTRACTION(S);  Surgeon: Chun Newman D.D.S.;  Location: SURGERY [facility];  Service:    • THYROIDECTOMY TOTAL  3/14/2012    Performed by JARED MAX at SURGERY SAME DAY [facility]S   • NODE DISSECTION  3/14/2012    Performed by JARED MAX at SURGERY SAME DAY [facility]   • BREAST BIOPSY      with benign breast mass removal   • GYN SURGERY      lumpectomy   • TUBAL LIGATION         Past Social Hx: Taylor Salamanca  reports that she has been smoking cigarettes. She has a 2.50 pack-year smoking history. She has never used smokeless tobacco. She reports current alcohol use. She reports that she does not use drugs.     Past Family Hx:  Taylor Salamanca family history includes Alcohol/Drug in her brother and sister; Bipolar disorder in her maternal grandmother; Depression in her maternal grandmother and mother; Heart Disease in her maternal grandfather and maternal grandmother; Lung Disease in her mother, paternal grandfather, and paternal grandmother; Psychiatric Illness in her father.     Problem list, medications, and allergies reviewed by myself today in Epic.     Objective:     /90 (BP Location: Left arm, Patient Position: Sitting, BP Cuff Size: Adult)   Pulse (!) 128   Temp 36.7 °C (98 °F) (Temporal)   Resp 20   Ht 1.575 m (5' 2\")   Wt 70.4 kg (155 lb 3.2 oz)   LMP 03/14/2012   SpO2 98%   BMI 28.39 kg/m²     Physical Exam  Vitals reviewed.   Constitutional:       Appearance: Normal appearance.   HENT:      Head: Normocephalic and atraumatic.      Right Ear: External ear normal.      Left Ear: External ear normal.      Nose: Nose normal.      Mouth/Throat:      Mouth: Mucous " membranes are moist.   Eyes:      Conjunctiva/sclera: Conjunctivae normal.   Cardiovascular:      Rate and Rhythm: Normal rate.   Pulmonary:      Effort: Pulmonary effort is normal.   Musculoskeletal:      Comments: Posterior popliteal swelling and mild tenderness, distal calf swelling and tenderness, trace edema extending from the tibia distally.  Multiple skin lesions.  Nontender over the bony prominences of the knee except for the aforementioned.  No ligamentous laxity.  Neurovascularly intact.  Ambulatory with a steady station gait   Skin:     General: Skin is warm and dry.      Capillary Refill: Capillary refill takes less than 2 seconds.   Neurological:      Mental Status: She is alert and oriented to person, place, and time.         Assessment/Plan:     Diagnosis and associated orders:     1. Leg swelling  US-EXTREMITY VENOUS LOWER UNILAT LEFT    CANCELED: US-EXTREMITY VENOUS LOWER UNILAT LEFT      Comments/MDM:     • History and physical support the diagnosis of Baker's cyst but due to the distal leg swelling I am somewhat concerned about a coagulopathy in the form of venous thromboembolism of the left lower extremity.  At this time the scheduling department was closed so we will try to schedule an appointment for an ultrasound to be performed tomorrow the patient can follow-up with her primary care provider on Tuesday regarding the results of this ultrasound.  In the interim time I recommended a low threshold to present to the ER if she notices any shortness of breath or chest pain.  Her pretest probability for lower extremity clot is quite low with a Wells score of 0.         Differential diagnosis, natural history, supportive care, and indications for immediate follow-up discussed.    Advised the patient to follow-up with the primary care physician for recheck, reevaluation, and consideration of further management.    Please note that this dictation was created using voice recognition software. I have  made a reasonable attempt to correct obvious errors, but I expect that there are errors of grammar and possibly content that I did not discover before finalizing the note.    This note was electronically signed by Tom Howard PA-C

## 2022-01-28 ENCOUNTER — OFFICE VISIT (OUTPATIENT)
Dept: URGENT CARE | Facility: CLINIC | Age: 52
End: 2022-01-28
Payer: COMMERCIAL

## 2022-01-28 VITALS
OXYGEN SATURATION: 98 % | DIASTOLIC BLOOD PRESSURE: 98 MMHG | HEIGHT: 62 IN | RESPIRATION RATE: 16 BRPM | TEMPERATURE: 96.6 F | WEIGHT: 150.4 LBS | SYSTOLIC BLOOD PRESSURE: 146 MMHG | BODY MASS INDEX: 27.68 KG/M2 | HEART RATE: 117 BPM

## 2022-01-28 DIAGNOSIS — Z86.14 HISTORY OF MRSA INFECTION: ICD-10-CM

## 2022-01-28 DIAGNOSIS — L08.9 WOUND INFECTION: ICD-10-CM

## 2022-01-28 DIAGNOSIS — T14.8XXA WOUND INFECTION: ICD-10-CM

## 2022-01-28 PROCEDURE — 90471 IMMUNIZATION ADMIN: CPT | Performed by: FAMILY MEDICINE

## 2022-01-28 PROCEDURE — 99214 OFFICE O/P EST MOD 30 MIN: CPT | Mod: 25 | Performed by: FAMILY MEDICINE

## 2022-01-28 PROCEDURE — 90715 TDAP VACCINE 7 YRS/> IM: CPT | Performed by: FAMILY MEDICINE

## 2022-01-28 RX ORDER — SULFAMETHOXAZOLE AND TRIMETHOPRIM 800; 160 MG/1; MG/1
1 TABLET ORAL EVERY 12 HOURS
Qty: 14 TABLET | Refills: 0 | Status: SHIPPED | OUTPATIENT
Start: 2022-01-28 | End: 2022-02-04

## 2022-01-28 RX ORDER — AMOXICILLIN 875 MG/1
875 TABLET, COATED ORAL 2 TIMES DAILY
Qty: 14 TABLET | Refills: 0 | Status: SHIPPED | OUTPATIENT
Start: 2022-01-28 | End: 2022-02-04

## 2022-01-28 ASSESSMENT — ENCOUNTER SYMPTOMS
MYALGIAS: 0
EYE DISCHARGE: 0
NAUSEA: 0
VOMITING: 0
WEIGHT LOSS: 0
EYE REDNESS: 0

## 2022-01-28 NOTE — PROGRESS NOTES
"Subjective     Taylor Salamanca is a 51 y.o. female who presents with Wound Infection (left ankle x 1 week)            3 weeks left medial leg wound.  1 week expanding redness and tenderness.  She feels swollen lymph nodes near her knee and groin.  No fever or chills.  PMH MRSA.  No function or sensory loss.  Tetanus is not up-to-date.  No other aggravating or alleviating factors.      Review of Systems   Constitutional: Negative for malaise/fatigue and weight loss.   Eyes: Negative for discharge and redness.   Gastrointestinal: Negative for nausea and vomiting.   Musculoskeletal: Negative for joint pain and myalgias.   Skin: Negative for itching and rash.              Objective     /98   Pulse (!) 117   Temp 35.9 °C (96.6 °F) (Temporal)   Resp 16   Ht 1.575 m (5' 2\")   Wt 68.2 kg (150 lb 6.4 oz)   LMP 03/14/2012   SpO2 98%   BMI 27.51 kg/m²      Physical Exam  Constitutional:       Appearance: Normal appearance.   Musculoskeletal:        Legs:    Skin:     General: Skin is warm and dry.   Neurological:      Mental Status: She is alert.                             Assessment & Plan               1. Wound infection  amoxicillin (AMOXIL) 875 MG tablet    sulfamethoxazole-trimethoprim (BACTRIM DS) 800-160 MG tablet    mupirocin (BACTROBAN) 2 % Ointment    Tdap =>6yo IM   2. History of MRSA infection       Differential diagnosis, natural history, supportive care, and indications for immediate follow-up discussed at length.             "

## 2022-11-03 ENCOUNTER — PATIENT MESSAGE (OUTPATIENT)
Dept: HEALTH INFORMATION MANAGEMENT | Facility: OTHER | Age: 52
End: 2022-11-03

## 2023-04-06 ENCOUNTER — OFFICE VISIT (OUTPATIENT)
Dept: URGENT CARE | Facility: CLINIC | Age: 53
End: 2023-04-06
Payer: COMMERCIAL

## 2023-04-06 VITALS
WEIGHT: 153.6 LBS | BODY MASS INDEX: 28.26 KG/M2 | OXYGEN SATURATION: 97 % | DIASTOLIC BLOOD PRESSURE: 76 MMHG | TEMPERATURE: 97.6 F | HEIGHT: 62 IN | RESPIRATION RATE: 16 BRPM | SYSTOLIC BLOOD PRESSURE: 130 MMHG | HEART RATE: 106 BPM

## 2023-04-06 DIAGNOSIS — K04.7 DENTAL INFECTION: ICD-10-CM

## 2023-04-06 DIAGNOSIS — K08.89 DENTALGIA: ICD-10-CM

## 2023-04-06 PROCEDURE — 99213 OFFICE O/P EST LOW 20 MIN: CPT | Performed by: PHYSICIAN ASSISTANT

## 2023-04-06 RX ORDER — AMOXICILLIN AND CLAVULANATE POTASSIUM 875; 125 MG/1; MG/1
1 TABLET, FILM COATED ORAL 2 TIMES DAILY
Qty: 20 TABLET | Refills: 0 | Status: SHIPPED | OUTPATIENT
Start: 2023-04-06

## 2023-04-06 RX ORDER — FLUTICASONE PROPIONATE 50 MCG
SPRAY, SUSPENSION (ML) NASAL
COMMUNITY
Start: 2023-03-30

## 2023-04-06 ASSESSMENT — ENCOUNTER SYMPTOMS
DIZZINESS: 0
BLURRED VISION: 0
VOMITING: 0
FEVER: 0
NAUSEA: 0
HEADACHES: 0
CHILLS: 0

## 2023-04-06 NOTE — PROGRESS NOTES
"Subjective:   Taylor Salamanca  is a 52 y.o. female who presents for Tooth Ache (Pt has tooth pain, lump on jaw x 2 months )      Other  Pertinent negatives include no chills, fever, headaches, nausea or vomiting. Patient presents urgent care noting fairly chronic waxing and waning dental infections for a number of the last few years.  Over the last 2 months has had an area of tenderness and swelling associated with broken tooth that she has become concerned as infected.  Patient does not have an appointment with dentist currently.  In the past she has had dental abscesses requiring I&D and communicating in the mandible as well as separately in the sinuses.  She has been hospitalized for dental infections in the past.  She currently denies fevers chills or nausea or vomiting.  Denies headache ear pain or visual changes associated.  She has tried no treatments thus far.  She presents urgent care requesting antibiotic therapy.    Review of Systems   Constitutional:  Negative for chills and fever.   HENT:          Dental infection, left lower   Eyes:  Negative for blurred vision.   Gastrointestinal:  Negative for nausea and vomiting.   Neurological:  Negative for dizziness and headaches.     Allergies   Allergen Reactions    Percocet [Perloxx] Itching     .        Objective:   /76 (BP Location: Left arm, Patient Position: Sitting, BP Cuff Size: Adult)   Pulse (!) 106   Temp 36.4 °C (97.6 °F) (Temporal)   Resp 16   Ht 1.575 m (5' 2\")   Wt 69.7 kg (153 lb 9.6 oz)   LMP 03/14/2012   SpO2 97%   BMI 28.09 kg/m²     Physical Exam  Vitals and nursing note reviewed.   Constitutional:       General: She is not in acute distress.     Appearance: She is well-developed. She is not diaphoretic.   HENT:      Head: Normocephalic and atraumatic.      Right Ear: External ear normal.      Left Ear: External ear normal.      Nose: Nose normal.      Mouth/Throat:      Lips: Pink.      Mouth: Mucous membranes are moist.      " Dentition: Abnormal dentition. Does not have dentures. Dental tenderness and dental caries present. No gingival swelling, dental abscesses or gum lesions.        Comments: No areas of deep erythema or fluctuance associated, evident dental decay  Eyes:      General: Lids are normal. No scleral icterus.        Right eye: No discharge.         Left eye: No discharge.      Conjunctiva/sclera: Conjunctivae normal.   Pulmonary:      Effort: Pulmonary effort is normal. No respiratory distress.   Musculoskeletal:         General: Normal range of motion.      Cervical back: Neck supple.   Skin:     General: Skin is warm and dry.      Coloration: Skin is not pale.      Findings: No erythema.   Neurological:      Mental Status: She is alert and oriented to person, place, and time. She is not disoriented.   Psychiatric:         Speech: Speech normal.         Behavior: Behavior normal.   Rocephin 500 mg IM-tolerates well    Assessment/Plan:   1. Dentalgia  - amoxicillin-clavulanate (AUGMENTIN) 875-125 MG Tab; Take 1 Tablet by mouth 2 times a day.  Dispense: 20 Tablet; Refill: 0  - cefTRIAXone (ROCEPHIN) 500 mg, lidocaine (XYLOCAINE) 1 % 1.8 mL for IM use    2. Dental infection  - amoxicillin-clavulanate (AUGMENTIN) 875-125 MG Tab; Take 1 Tablet by mouth 2 times a day.  Dispense: 20 Tablet; Refill: 0  - cefTRIAXone (ROCEPHIN) 500 mg, lidocaine (XYLOCAINE) 1 % 1.8 mL for IM use    Other orders  - fluticasone (FLONASE) 50 MCG/ACT nasal spray; INSTILL ONE (1) SPRAY IN EACH NOSTRIL ONCE DAILY  Supportive care is reviewed with patient/caregiver - recommend to push PO fluids and electrolytes,  take full course of Rx, take with probiotics, observe for resolution  Return to clinic with lack of resolution or progression of symptoms.  ER precautions with any worsening symptoms are reviewed with patient/caregiver and they do express understanding  Stressed the importance of dental follow-up with persistent symptoms/for definitive care    I  have worn an N95 mask, gloves and eye protection for the entire encounter with this patient.     Differential diagnosis, natural history, supportive care, and indications for immediate follow-up discussed.

## 2023-05-04 NOTE — THERAPY
"Physical Therapy Evaluation completed.   Bed Mobility:  Supine to Sit: Stand by Assist  Transfers: Sit to Stand: Stand by Assist  Gait: Level Of Assist: Stand by Assist with Crutches   X 200 feet    Plan of Care: Patient with no further skilled PT needs in the acute care setting at this time  Discharge Recommendations: Equipment: No Equipment Needed. Post-acute therapy Discharge to home with outpatient or home health for additional skilled therapy services.    See \"Rehab Therapy-Acute\" Patient Summary Report for complete documentation.     " Detail Level: Detailed

## 2023-05-29 ENCOUNTER — OFFICE VISIT (OUTPATIENT)
Dept: URGENT CARE | Facility: CLINIC | Age: 53
End: 2023-05-29
Payer: COMMERCIAL

## 2023-05-29 ENCOUNTER — HOSPITAL ENCOUNTER (EMERGENCY)
Facility: MEDICAL CENTER | Age: 53
End: 2023-05-29
Attending: EMERGENCY MEDICINE
Payer: COMMERCIAL

## 2023-05-29 VITALS
SYSTOLIC BLOOD PRESSURE: 127 MMHG | HEIGHT: 62 IN | BODY MASS INDEX: 29.01 KG/M2 | WEIGHT: 157.63 LBS | TEMPERATURE: 98.3 F | DIASTOLIC BLOOD PRESSURE: 79 MMHG | RESPIRATION RATE: 16 BRPM | OXYGEN SATURATION: 96 % | HEART RATE: 95 BPM

## 2023-05-29 VITALS
HEART RATE: 104 BPM | RESPIRATION RATE: 14 BRPM | SYSTOLIC BLOOD PRESSURE: 124 MMHG | BODY MASS INDEX: 28.03 KG/M2 | DIASTOLIC BLOOD PRESSURE: 82 MMHG | TEMPERATURE: 97.5 F | HEIGHT: 63 IN | OXYGEN SATURATION: 96 % | WEIGHT: 158.2 LBS

## 2023-05-29 DIAGNOSIS — L02.91 ABSCESS: ICD-10-CM

## 2023-05-29 DIAGNOSIS — L02.413 CUTANEOUS ABSCESS OF RIGHT UPPER EXTREMITY: ICD-10-CM

## 2023-05-29 PROCEDURE — 3079F DIAST BP 80-89 MM HG: CPT

## 2023-05-29 PROCEDURE — 3074F SYST BP LT 130 MM HG: CPT

## 2023-05-29 PROCEDURE — 99215 OFFICE O/P EST HI 40 MIN: CPT

## 2023-05-29 PROCEDURE — 700111 HCHG RX REV CODE 636 W/ 250 OVERRIDE (IP): Mod: UD | Performed by: EMERGENCY MEDICINE

## 2023-05-29 PROCEDURE — A9270 NON-COVERED ITEM OR SERVICE: HCPCS | Mod: UD | Performed by: EMERGENCY MEDICINE

## 2023-05-29 PROCEDURE — 700102 HCHG RX REV CODE 250 W/ 637 OVERRIDE(OP): Mod: UD | Performed by: EMERGENCY MEDICINE

## 2023-05-29 PROCEDURE — 303977 HCHG I & D

## 2023-05-29 PROCEDURE — 99283 EMERGENCY DEPT VISIT LOW MDM: CPT

## 2023-05-29 RX ORDER — SULFAMETHOXAZOLE AND TRIMETHOPRIM 800; 160 MG/1; MG/1
1 TABLET ORAL ONCE
Status: COMPLETED | OUTPATIENT
Start: 2023-05-29 | End: 2023-05-29

## 2023-05-29 RX ORDER — TOLTERODINE 4 MG/1
CAPSULE, EXTENDED RELEASE ORAL
COMMUNITY
Start: 2023-05-02

## 2023-05-29 RX ORDER — BACLOFEN 5 MG/1
TABLET ORAL
COMMUNITY
Start: 2023-05-05

## 2023-05-29 RX ORDER — SULFAMETHOXAZOLE AND TRIMETHOPRIM 800; 160 MG/1; MG/1
1 TABLET ORAL 2 TIMES DAILY
Qty: 14 TABLET | Refills: 0 | Status: ACTIVE | OUTPATIENT
Start: 2023-05-29 | End: 2023-06-05

## 2023-05-29 RX ADMIN — LIDOCAINE HYDROCHLORIDE 10 ML: 10 INJECTION, SOLUTION EPIDURAL; INFILTRATION; INTRACAUDAL at 17:15

## 2023-05-29 RX ADMIN — SULFAMETHOXAZOLE AND TRIMETHOPRIM 1 TABLET: 800; 160 TABLET ORAL at 17:17

## 2023-05-29 ASSESSMENT — ENCOUNTER SYMPTOMS
TINGLING: 0
CHILLS: 1

## 2023-05-29 NOTE — PROGRESS NOTES
"Subjective:   Taylor Salamanca is a 52 y.o. female who presents for Insect Bite (X 4-5 days ago, pt suspects spider bite on right wrist. She pulled stinger out.)      HPI: This is a 52-year-old female who presents today for evaluation of possible \"bug bite \"to her right wrist.  Patient reports pain, swelling, and redness to right wrist 5 days ago.  She reports pulling out a stinger from her right wrist with tweezers 5 days ago.  She reports pain is 8\10.  She denies fevers.  She reports associated chills.      Review of Systems   Constitutional:  Positive for chills.   Musculoskeletal:         +R wrist pain   Neurological:  Negative for tingling.   All other systems reviewed and are negative.      Medications:    Current Outpatient Medications on File Prior to Visit   Medication Sig Dispense Refill    tolterodine ER (DETROL LA) 4 MG CAPSULE SR 24 HR TAKE 1 CAPSULE BY MOUTH EVERY DAY FOR 90 DAYS      fluticasone (FLONASE) 50 MCG/ACT nasal spray INSTILL ONE (1) SPRAY IN EACH NOSTRIL ONCE DAILY      celecoxib (CELEBREX) 100 MG Cap Take 100 mg by mouth.      levothyroxine (SYNTHROID) 137 MCG Tab 1 TABLET IN THE MORNING ON AN EMPTY STOMACH ONCE A DAY ORALLY 90      DULoxetine (CYMBALTA) 60 MG Cap DR Particles delayed-release capsule Take 1 Cap by mouth 2 times a day. 180 Cap 3    gabapentin (NEURONTIN) 300 MG Cap Take 300 mg po in am, and 600 mg po in pm 270 Cap 3    baclofen (LIORESAL) 5 MG Tab TAKE ONE (1) TABLET BY MOUTH TWICE DAILY AS NEEDED      amoxicillin-clavulanate (AUGMENTIN) 875-125 MG Tab Take 1 Tablet by mouth 2 times a day. (Patient not taking: Reported on 5/29/2023) 20 Tablet 0    mupirocin (BACTROBAN) 2 % Ointment Apply to affected area twice daily for 7 days (Patient not taking: Reported on 5/29/2023) 30 g 0    SYNTHROID 150 MCG Tab TAKE 1 TABLET BY MOUTH EVERY MORNING ON AN EMPTY STOMACH (Patient not taking: Reported on 5/29/2023) 90 Tab 0    cyclobenzaprine (FLEXERIL) 5 MG tablet Take 1-2 Tabs by " mouth 3 times a day as needed. (Patient not taking: Reported on 5/29/2023) 30 Tab 0    acetaminophen (TYLENOL) 500 MG Tab Take 1-2 Tabs by mouth every 8 hours as needed for Moderate Pain. (Patient not taking: Reported on 5/29/2023) 270 Tab 1     No current facility-administered medications on file prior to visit.        Allergies:   Percocet [perloxx]    Problem List:   Patient Active Problem List   Diagnosis    Narcolepsy and cataplexy    Bipolar 2 disorder (HCC)    Low back pain    Hyperlipidemia    NSAID long-term use    Arthritis    Postoperative hypothyroidism    Right hand pain    Chronic sinus complaints    Cervical pain (neck)    Foot deformity    TROY (obstructive sleep apnea)    Tobacco abuse    BMI 31.0-31.9,adult    Opiate dependence (HCC)    Insomnia due to medical condition    Neuropathy        Surgical History:  Past Surgical History:   Procedure Laterality Date    HIP ARTHROPLASTY TOTAL Right 7/11/2018    Procedure: HIP ARTHROPLASTY TOTAL;  Surgeon: Colin Hawley M.D.;  Location: [facility];  Service: Orthopedics    SUBMANDIBLE ABSCESS INCISION AND DRAINAGE Right 8/11/2016    Procedure: SUBMANDIBLE ABSCESS INCISION AND DRAINAGE;  Surgeon: Chun Newman D.D.SMauricio;  Location: [facility];  Service:     DENTAL EXTRACTION(S) Right 8/11/2016    Procedure: DENTAL EXTRACTION(S);  Surgeon: Chun Newman D.D.SMauricio;  Location: [facility];  Service:     THYROIDECTOMY TOTAL  3/14/2012    Performed by JARED MAX at SURGERY SAME DAY [facility]    NODE DISSECTION  3/14/2012    Performed by JARED MAX at SURGERY SAME DAY [facility]    BREAST BIOPSY      with benign breast mass removal    GYN SURGERY      lumpectomy    TUBAL LIGATION         Past Social Hx:   Social History     Tobacco Use    Smoking status: Every Day     Packs/day: 0.25     Years: 10.00     Pack years: 2.50     Types: Cigarettes    Smokeless tobacco: Never    Tobacco comments:     started 7/09      "A couple cigarettes a day unless pt is out   Vaping Use    Vaping Use: Never used   Substance Use Topics    Alcohol use: Yes     Alcohol/week: 0.0 oz     Comment: Socially    Drug use: No          Problem list, medications, and allergies reviewed by myself today in Epic.     Objective:     /82   Pulse (!) 104   Temp 36.4 °C (97.5 °F) (Temporal)   Resp 14   Ht 1.588 m (5' 2.5\") Comment: per pt  Wt 71.8 kg (158 lb 3.2 oz) Comment: w shoes  LMP 03/14/2012   SpO2 96%   BMI 28.47 kg/m²     Physical Exam  Vitals and nursing note reviewed.   Constitutional:       General: She is not in acute distress.     Appearance: Normal appearance. She is normal weight. She is not ill-appearing, toxic-appearing or diaphoretic.   HENT:      Head: Normocephalic and atraumatic.   Cardiovascular:      Rate and Rhythm: Regular rhythm. Tachycardia present.      Pulses: Normal pulses.      Heart sounds: Normal heart sounds. No murmur heard.     No friction rub. No gallop.   Pulmonary:      Effort: Pulmonary effort is normal. No respiratory distress.      Breath sounds: Normal breath sounds. No stridor. No wheezing, rhonchi or rales.   Chest:      Chest wall: No tenderness.   Musculoskeletal:      Right wrist: Swelling and tenderness present. Normal range of motion.      Comments: FROM to R wrist with pain   Skin:     General: Skin is warm and dry.      Capillary Refill: Capillary refill takes less than 2 seconds.             Comments: Right wrist: 2cm abscess with surrounding erythema, +increased warmth, +swelling, +fluctuance   Neurological:      General: No focal deficit present.      Mental Status: She is alert and oriented to person, place, and time. Mental status is at baseline.            Assessment/Plan:     Diagnosis and associated orders:   1. Abscess               Comments/MDM:   Acute problem.  Patient noted to have abscess to radial aspect of right wrist.  She has pain with movement.  Patient is mildly tachycardic " with heart rate of 104 and reports associated chills.  Given the extent of abscess and surrounding cellulitis, I do believe patient needs imaging and labs to evaluate extent of infection.  At this time, I am referring patient to the emergency room where stat labs and imaging can be obtained.  Patient is agreeable this plan will have significant other take her to the emergency room.           Please note that this dictation was created using voice recognition software. I have made a reasonable attempt to correct obvious errors, but I expect that there are errors of grammar and possibly content that I did not discover before finalizing the note.    This note was electronically signed by ELVA Ivey

## 2023-05-29 NOTE — ED TRIAGE NOTES
Pt ambulated to triage with   Chief Complaint   Patient presents with    Sent by MD     Seen at MD office, possible spider bite to right hand and pain into joint, sent for further workup, first happened 5 days ago.      Pt Informed regarding triage process and verbalized understanding to inform triage tech or RN for any changes in condition. Placed in lobby.

## 2023-05-29 NOTE — ED PROVIDER NOTES
ED Provider Note    Primary care provider: Jasmina Blanco M.D.    CHIEF COMPLAINT  Chief Complaint   Patient presents with    Sent by MD     Seen at MD office, possible spider bite to right hand and pain into joint, sent for further workup, first happened 5 days ago.    HPI  Taylor Salamanca is a 52 y.o. female who presents to the Emergency Department 5 days of an abscess on the radial aspect of the distal forearm.  The patient is not diabetic, she denies any allergies.  She denies any fevers or chills.  She has been expressing some purulence at home.  She feels that it worsened after giving her dog a bath about a day or 2 ago.      External Record Review: Patient went to urgent care today for an abscess, they were concerned that she was tachycardic and sent here for for further evaluation and treatment.  They did not drain the abscess.    REVIEW OF SYSTEMS  See HPI.     PAST MEDICAL HISTORY   has a past medical history of Allergy, Anesthesia, Aphthous stomatitis, Arthritis, Bipolar 2 disorder (HCC), Cancer (HCC), Depression, Hyperlipidemia, Infectious disease, LBP (low back pain), MEDICAL HOME, Narcolepsy and cataplexy, Neck pain, Pain (06/27/2018), and Thyroid cancer ([facility]) (1/2012).    SURGICAL HISTORY   has a past surgical history that includes gyn surgery; tubal ligation; breast biopsy; thyroidectomy total (3/14/2012); node dissection (3/14/2012); submandible abscess incision and drainage (Right, 8/11/2016); dental extraction(s) (Right, 8/11/2016); and hip arthroplasty total (Right, 7/11/2018).    SOCIAL HISTORY  Social History     Tobacco Use    Smoking status: Some Days     Packs/day: 0.25     Years: 10.00     Pack years: 2.50     Types: Cigarettes    Smokeless tobacco: Never    Tobacco comments:     started 7/09     A couple cigarettes a day unless pt is out   Vaping Use    Vaping Use: Never used   Substance Use Topics    Alcohol use: Yes     Alcohol/week: 0.0 oz     Comment: Socially    Drug use: No     "  Social History     Substance and Sexual Activity   Drug Use No       FAMILY HISTORY  Family History   Problem Relation Age of Onset    Psychiatric Illness Father     Alcohol/Drug Sister     Alcohol/Drug Brother     Heart Disease Maternal Grandmother     Bipolar disorder Maternal Grandmother     Depression Maternal Grandmother     Heart Disease Maternal Grandfather     Lung Disease Paternal Grandmother     Lung Disease Paternal Grandfather     Lung Disease Mother         sleep apnea    Depression Mother        CURRENT MEDICATIONS  Reviewed.  See Encounter Summary.     ALLERGIES  Allergies   Allergen Reactions    Percocet [Perloxx] Itching     .       PHYSICAL EXAM  VITAL SIGNS: /79   Pulse 95   Temp 36.8 °C (98.3 °F) (Temporal)   Resp 16   Ht 1.575 m (5' 2\")   Wt 71.5 kg (157 lb 10.1 oz)   LMP 03/14/2012   SpO2 96%   BMI 28.83 kg/m²   Constitutional: Awake, alert in no apparent distress.  HENT: Normocephalic, Bilateral external ears normal. Nose normal.   Eyes: Conjunctiva normal, non-icteric, EOMI.    Thorax & Lungs: Easy unlabored respirations, Clear to ascultation bilaterally.  Cardiovascular: Borderline tachycardic, No murmurs, rubs or gallops. Bilateral pulses symmetrical.   Abdomen:  Soft, nontender, nondistended, normal active bowel sounds.   :    Skin: On the radial aspect of the forearm, just proximal to the wrist there is 2 cm area of induration and fluctuance, raised about 1.5 cm from the surrounding skin also with 3 cm of surrounding erythema.  See photograph from the urgent care.    Musculoskeletal:   No cyanosis, clubbing or edema. No leg asymmetry.  Full range of motion of the right wrist.  Neurologic: Alert, Grossly non-focal.   Psychiatric: Normal affect, Normal mood  Lymphatic:            COURSE & MEDICAL DECISION MAKING  Pertinent Labs & Imaging studies reviewed. (See chart for details)    COURSE & MEDICAL DECISION MAKING  Pertinent Labs & Imaging studies reviewed. (See chart for " details)  4:50 PM - Nursing notes reviewed, patient seen and examined at bedside.    Procedure note:  The area of induration was anesthetized with 1% lidocaine without epinephrine.  Following this I used a 10 blade and made a 1 cm incision over the abscess, I blunt dissected with forceps, loculations were present and were destroyed.  A moderate amount of necrotic blood and purulence was expressed.  The wound was left open and packed with quarter inch plain packing strip.  This was tolerated well without any immediate complications.  With the packing in place this would be defined as a complicated abscess drainage.    Escalation of care considered, and ultimately not performed: blood analysis and diagnostic imaging.  Decision Making:  This is a pleasant 52 y.o. year old female who presents with an abscess on the distal forearm.  The abscess is not in the wrist joint, the patient has no limitation in wrist range of motion.  The abscess was at least 1.5 cm in depth which is why packing was placed.  The patient was sent from the urgent care for imaging and laboratory evaluation but there is no imaging required for a cutaneous abscess, it clearly does not penetrate into the joint, I am not concerned about necrotizing fasciitis.  I do not feel that laboratory evaluation will be beneficial, the patient may indeed have a leukocytosis but certainly does not require hospitalization at this juncture.  She is not diabetic, she is not immunocompromise.  The abscess was drained and packed as above.  She was given very explicit return precautions.  I want her to remove the packing in about 24 to 48 hours, watch closely for any worsening symptoms.  If she has redness spreading up the arm, decreasing range of motion of the wrist or elbow I would like her to return immediately for repeat evaluation.     The patient was discharged home (see d/c instructions) was told to return immediately for any signs or symptoms listed, or any  worsening at all.  The patient verbally agreed to the discharge precautions and follow-up plan which is documented in EPIC.    Discharge Medications:  Discharge Medication List as of 5/29/2023  5:45 PM        START taking these medications    Details   sulfamethoxazole-trimethoprim (BACTRIM DS) 800-160 MG tablet Take 1 Tablet by mouth 2 times a day for 7 days., Disp-14 Tablet, R-0, Normal             FINAL IMPRESSION  1. Cutaneous abscess of right upper extremity

## 2023-05-30 NOTE — ED NOTES
DC home with written and verbal instructions regarding f/u, activity and RX to  at pharmacy and reasons to return to ED and wound care.  Verbalized understanding of all instructions, ambulated out of ED with a steady gait with all belongings in no distress.

## 2023-05-30 NOTE — ED NOTES
Pt cleared for discharge, VSS. Dressing with adaptic, gauze and kerlix applied to arm. Pt provided extra dressing supplies.

## 2023-11-14 ENCOUNTER — HOSPITAL ENCOUNTER (EMERGENCY)
Facility: MEDICAL CENTER | Age: 53
End: 2023-11-14
Attending: EMERGENCY MEDICINE
Payer: COMMERCIAL

## 2023-11-14 ENCOUNTER — APPOINTMENT (OUTPATIENT)
Dept: RADIOLOGY | Facility: MEDICAL CENTER | Age: 53
End: 2023-11-14
Attending: EMERGENCY MEDICINE
Payer: COMMERCIAL

## 2023-11-14 VITALS
TEMPERATURE: 97 F | WEIGHT: 160 LBS | BODY MASS INDEX: 29.44 KG/M2 | RESPIRATION RATE: 18 BRPM | HEART RATE: 98 BPM | HEIGHT: 62 IN | SYSTOLIC BLOOD PRESSURE: 119 MMHG | DIASTOLIC BLOOD PRESSURE: 69 MMHG | OXYGEN SATURATION: 95 %

## 2023-11-14 DIAGNOSIS — S05.8X1A ABRASION OF SCLERA OF RIGHT EYE, INITIAL ENCOUNTER: ICD-10-CM

## 2023-11-14 PROCEDURE — 70480 CT ORBIT/EAR/FOSSA W/O DYE: CPT

## 2023-11-14 PROCEDURE — 99284 EMERGENCY DEPT VISIT MOD MDM: CPT

## 2023-11-14 PROCEDURE — 96374 THER/PROPH/DIAG INJ IV PUSH: CPT

## 2023-11-14 PROCEDURE — 700101 HCHG RX REV CODE 250: Performed by: EMERGENCY MEDICINE

## 2023-11-14 RX ORDER — ERYTHROMYCIN 5 MG/G
1 OINTMENT OPHTHALMIC
Qty: 3.5 G | Refills: 0 | Status: ACTIVE | OUTPATIENT
Start: 2023-11-14 | End: 2023-11-19

## 2023-11-14 RX ORDER — PROPARACAINE HYDROCHLORIDE 5 MG/ML
1 SOLUTION/ DROPS OPHTHALMIC ONCE
Status: COMPLETED | OUTPATIENT
Start: 2023-11-14 | End: 2023-11-14

## 2023-11-14 RX ORDER — ERYTHROMYCIN 5 MG/G
1 OINTMENT OPHTHALMIC ONCE
Status: COMPLETED | OUTPATIENT
Start: 2023-11-14 | End: 2023-11-14

## 2023-11-14 RX ADMIN — PROPARACAINE HYDROCHLORIDE 1 DROP: 5 SOLUTION/ DROPS OPHTHALMIC at 04:30

## 2023-11-14 RX ADMIN — FLUORESCEIN SODIUM 1 MG: 1 STRIP OPHTHALMIC at 04:30

## 2023-11-14 RX ADMIN — ERYTHROMYCIN 1 APPLICATION: 5 OINTMENT OPHTHALMIC at 06:01

## 2023-11-14 NOTE — ED NOTES
Pt medicated per MAR. Discharge instructions given by primary Rn. Pt ambulatory with steady gait to the ED [place]
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
## 2023-11-14 NOTE — ED NOTES
Pt provided discharge instructions. Pt verbalized understanding of all instructions. IV removed, cathlon intact, site without s/s of infection.
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
## 2024-05-03 ENCOUNTER — APPOINTMENT (OUTPATIENT)
Dept: URGENT CARE | Facility: PHYSICIAN GROUP | Age: 54
End: 2024-05-03
Payer: COMMERCIAL
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
## (undated) DEVICE — HEAD HOLDER JUNIOR/ADULT

## (undated) DEVICE — BAG, SPONGE COUNT 50600

## (undated) DEVICE — WATER IRRIGATION STERILE 1000ML (12EA/CA)

## (undated) DEVICE — NEPTUNE 4 PORT MANIFOLD - (20/PK)

## (undated) DEVICE — MASK ANESTHESIA ADULT  - (100/CA)

## (undated) DEVICE — CHLORAPREP 26 ML APPLICATOR - ORANGE TINT(25/CA)

## (undated) DEVICE — SUTURE 2-0 VICRYL PLUS CT-1 - 8 X 18 INCH(12/BX)

## (undated) DEVICE — DRESSING AQUACEL AG ADVANTAGE 3.5 X 10" (10EA/BX)"

## (undated) DEVICE — GLOVE BIOGEL INDICATOR SZ 8.5 SURGICAL PF LTX - (50/BX 4BX/CA)

## (undated) DEVICE — MASK, LARYNGEAL AIRWAY #4

## (undated) DEVICE — DRESSING POST OP BORDER 4 X 10 (5EA/BX)

## (undated) DEVICE — KIT ANESTHESIA W/CIRCUIT & 3/LT BAG W/FILTER (20EA/CA)

## (undated) DEVICE — SODIUM CHL. IRRIGATION 0.9% 3000ML (4EA/CA 65CA/PF)

## (undated) DEVICE — SUTURE 1 VICRYL PLUS CTX - 8 X 18 INCH (12/BX)

## (undated) DEVICE — LENS/HOOD FOR SPACESUIT - (32/PK) PEEL AWAY FACE

## (undated) DEVICE — SODIUM CHL IRRIGATION 0.9% 1000ML (12EA/CA)

## (undated) DEVICE — HUMID-VENT HEAT AND MOISTURE EXCHANGE- (50/BX)

## (undated) DEVICE — GOWN WARMING STANDARD FLEX - (30/CA)

## (undated) DEVICE — GLOVE BIOGEL INDICATOR SZ 7SURGICAL PF LTX - (50/BX 4BX/CA)

## (undated) DEVICE — GLOVE BIOGEL INDICATOR SZ 7.5 SURGICAL PF LTX - (50PR/BX 4BX/CA)

## (undated) DEVICE — LACTATED RINGERS INJ 1000 ML - (14EA/CA 60CA/PF)

## (undated) DEVICE — BLADE SAGITTAL SAW DUAL CUT 75.0 X 25.0MM (1/EA)

## (undated) DEVICE — ELECTRODE 850 FOAM ADHESIVE - HYDROGEL RADIOTRNSPRNT (50/PK)

## (undated) DEVICE — PROTECTOR ULNA NERVE - (36PR/CA)

## (undated) DEVICE — SENSOR SPO2 NEO LNCS ADHESIVE (20/BX) SEE USER NOTES

## (undated) DEVICE — PACK TOTAL HIP - (1/CA)

## (undated) DEVICE — SUTURE 3-0 MONOCRYL PLUS PS-1 - 27 INCH (36/BX)

## (undated) DEVICE — CANISTER SUCTION RIGID RED 1500CC (40EA/CA)

## (undated) DEVICE — HANDPIECE 10FT INTPLS SCT PLS IRRIGATION HAND CONTROL SET (6/PK)

## (undated) DEVICE — KIT ROOM DECONTAMINATION

## (undated) DEVICE — ELECTRODE DUAL RETURN W/ CORD - (50/PK)

## (undated) DEVICE — SUTURE 1 PDS-2 PLUS CTX - (24/BX)

## (undated) DEVICE — SUTURE GENERAL

## (undated) DEVICE — SUTURE 5 ETHIBOND V-37 (12PK/BX)

## (undated) DEVICE — GLOVE BIOGEL SZ 7 SURGICAL PF LTX - (50PR/BX 4BX/CA)

## (undated) DEVICE — GLOVE, LITE (PAIR)

## (undated) DEVICE — GLOVE BIOGEL SZ 6.5 SURGICAL PF LTX (50PR/BX 4BX/CA)

## (undated) DEVICE — GLOVE BIOGEL SZ 7.5 SURGICAL PF LTX - (50PR/BX 4BX/CA)

## (undated) DEVICE — GLOVE BIOGEL INDICATOR SZ 8 SURGICAL PF LTX - (50/BX 4BX/CA)

## (undated) DEVICE — TUBE CONNECTING SUCTION - CLEAR PLASTIC STERILE 72 IN (50EA/CA)

## (undated) DEVICE — SUCTION INSTRUMENT YANKAUER BULBOUS TIP W/O VENT (50EA/CA)

## (undated) DEVICE — GLOVE BIOGEL SZ 8 SURGICAL PF LTX - (50PR/BX 4BX/CA)

## (undated) DEVICE — PAD BABY LAP 4X18 W/O - RINGS PREWASHED 5/PK 40PK/CS